# Patient Record
Sex: FEMALE | Race: WHITE | NOT HISPANIC OR LATINO | Employment: FULL TIME | URBAN - METROPOLITAN AREA
[De-identification: names, ages, dates, MRNs, and addresses within clinical notes are randomized per-mention and may not be internally consistent; named-entity substitution may affect disease eponyms.]

---

## 2021-05-07 ENCOUNTER — IMMUNIZATION (OUTPATIENT)
Dept: INTERNAL MEDICINE | Facility: CLINIC | Age: 51
End: 2021-05-07

## 2021-06-04 ENCOUNTER — IMMUNIZATION (OUTPATIENT)
Dept: INTERNAL MEDICINE | Facility: CLINIC | Age: 51
End: 2021-06-04

## 2021-06-04 DIAGNOSIS — Z23 NEED FOR VACCINATION: Primary | ICD-10-CM

## 2021-06-04 PROCEDURE — 91301 COVID-19, MRNA, LNP-S, PF, 100 MCG/0.5 ML DOSE VACCINE: ICD-10-PCS | Mod: ,,, | Performed by: FAMILY MEDICINE

## 2021-06-04 PROCEDURE — 0012A COVID-19, MRNA, LNP-S, PF, 100 MCG/0.5 ML DOSE VACCINE: ICD-10-PCS | Mod: CV19,,, | Performed by: FAMILY MEDICINE

## 2021-06-04 PROCEDURE — 91301 COVID-19, MRNA, LNP-S, PF, 100 MCG/0.5 ML DOSE VACCINE: CPT | Mod: ,,, | Performed by: FAMILY MEDICINE

## 2021-06-04 PROCEDURE — 0012A COVID-19, MRNA, LNP-S, PF, 100 MCG/0.5 ML DOSE VACCINE: CPT | Mod: CV19,,, | Performed by: FAMILY MEDICINE

## 2021-12-06 ENCOUNTER — TELEPHONE (OUTPATIENT)
Dept: ADMINISTRATIVE | Facility: OTHER | Age: 51
End: 2021-12-06
Payer: COMMERCIAL

## 2021-12-15 ENCOUNTER — IMMUNIZATION (OUTPATIENT)
Dept: FAMILY MEDICINE | Facility: CLINIC | Age: 51
End: 2021-12-15
Payer: COMMERCIAL

## 2021-12-15 DIAGNOSIS — Z23 NEED FOR VACCINATION: Primary | ICD-10-CM

## 2021-12-15 PROCEDURE — 0064A COVID-19, MRNA, LNP-S, PF, 100 MCG/0.25 ML DOSE VACCINE (MODERNA BOOSTER): CPT | Mod: PBBFAC

## 2021-12-28 ENCOUNTER — OFFICE VISIT (OUTPATIENT)
Dept: OBSTETRICS AND GYNECOLOGY | Facility: CLINIC | Age: 51
End: 2021-12-28
Payer: COMMERCIAL

## 2021-12-28 VITALS — WEIGHT: 138.44 LBS

## 2021-12-28 DIAGNOSIS — D21.9 FIBROIDS: Primary | ICD-10-CM

## 2021-12-28 PROCEDURE — 99999 PR PBB SHADOW E&M-EST. PATIENT-LVL II: ICD-10-PCS | Mod: PBBFAC,,, | Performed by: OBSTETRICS & GYNECOLOGY

## 2021-12-28 PROCEDURE — 99999 PR PBB SHADOW E&M-EST. PATIENT-LVL II: CPT | Mod: PBBFAC,,, | Performed by: OBSTETRICS & GYNECOLOGY

## 2021-12-28 PROCEDURE — 1159F PR MEDICATION LIST DOCUMENTED IN MEDICAL RECORD: ICD-10-PCS | Mod: CPTII,S$GLB,, | Performed by: OBSTETRICS & GYNECOLOGY

## 2021-12-28 PROCEDURE — 1159F MED LIST DOCD IN RCRD: CPT | Mod: CPTII,S$GLB,, | Performed by: OBSTETRICS & GYNECOLOGY

## 2021-12-28 PROCEDURE — 99203 PR OFFICE/OUTPT VISIT, NEW, LEVL III, 30-44 MIN: ICD-10-PCS | Mod: S$GLB,,, | Performed by: OBSTETRICS & GYNECOLOGY

## 2021-12-28 PROCEDURE — 99203 OFFICE O/P NEW LOW 30 MIN: CPT | Mod: S$GLB,,, | Performed by: OBSTETRICS & GYNECOLOGY

## 2022-01-05 ENCOUNTER — HOSPITAL ENCOUNTER (OUTPATIENT)
Dept: RADIOLOGY | Facility: HOSPITAL | Age: 52
Discharge: HOME | End: 2022-01-05
Attending: OBSTETRICS & GYNECOLOGY
Payer: COMMERCIAL

## 2022-01-05 DIAGNOSIS — M25.511 RIGHT SHOULDER PAIN, UNSPECIFIED CHRONICITY: Primary | ICD-10-CM

## 2022-01-05 DIAGNOSIS — D21.9 FIBROIDS: ICD-10-CM

## 2022-01-05 PROCEDURE — 76856 US PELVIS COMPLETE WITH TRANSVAG FOR IUD: ICD-10-PCS | Mod: 26,,, | Performed by: RADIOLOGY

## 2022-01-05 PROCEDURE — 76856 US EXAM PELVIC COMPLETE: CPT | Mod: 26,,, | Performed by: RADIOLOGY

## 2022-01-05 PROCEDURE — 76830 TRANSVAGINAL US NON-OB: CPT | Mod: 26,,, | Performed by: RADIOLOGY

## 2022-01-05 PROCEDURE — 76830 US PELVIS COMPLETE WITH TRANSVAG FOR IUD: ICD-10-PCS | Mod: 26,,, | Performed by: RADIOLOGY

## 2022-01-05 PROCEDURE — 76830 TRANSVAGINAL US NON-OB: CPT | Mod: TC

## 2022-01-06 ENCOUNTER — HOSPITAL ENCOUNTER (OUTPATIENT)
Dept: RADIOLOGY | Facility: CLINIC | Age: 52
Discharge: HOME | End: 2022-01-06
Attending: ORTHOPAEDIC SURGERY
Payer: COMMERCIAL

## 2022-01-06 DIAGNOSIS — M25.511 RIGHT SHOULDER PAIN, UNSPECIFIED CHRONICITY: ICD-10-CM

## 2022-01-06 PROCEDURE — 73030 X-RAY EXAM OF SHOULDER: CPT | Mod: 26,RT,, | Performed by: RADIOLOGY

## 2022-01-06 PROCEDURE — 73030 XR SHOULDER COMPLETE 2 OR MORE VIEWS RIGHT: ICD-10-PCS | Mod: TC,RT,, | Performed by: ORTHOPAEDIC SURGERY

## 2022-01-06 PROCEDURE — 73030 XR SHOULDER COMPLETE 2 OR MORE VIEWS RIGHT: ICD-10-PCS | Mod: 26,RT,, | Performed by: RADIOLOGY

## 2022-01-06 PROCEDURE — 73030 X-RAY EXAM OF SHOULDER: CPT | Mod: TC,RT,, | Performed by: ORTHOPAEDIC SURGERY

## 2022-01-13 ENCOUNTER — OFFICE VISIT (OUTPATIENT)
Dept: ORTHOPEDICS | Facility: CLINIC | Age: 52
End: 2022-01-13
Payer: COMMERCIAL

## 2022-01-13 VITALS — RESPIRATION RATE: 18 BRPM | WEIGHT: 138 LBS

## 2022-01-13 DIAGNOSIS — M25.511 RIGHT SHOULDER PAIN, UNSPECIFIED CHRONICITY: Primary | ICD-10-CM

## 2022-01-13 DIAGNOSIS — M24.011 LOOSE BODY IN RIGHT SHOULDER: ICD-10-CM

## 2022-01-13 PROCEDURE — 99243 OFF/OP CNSLTJ NEW/EST LOW 30: CPT | Mod: S$GLB,,, | Performed by: ORTHOPAEDIC SURGERY

## 2022-01-13 PROCEDURE — 99999 PR PBB SHADOW E&M-EST. PATIENT-LVL III: ICD-10-PCS | Mod: PBBFAC,,, | Performed by: ORTHOPAEDIC SURGERY

## 2022-01-13 PROCEDURE — 1159F PR MEDICATION LIST DOCUMENTED IN MEDICAL RECORD: ICD-10-PCS | Mod: CPTII,S$GLB,, | Performed by: ORTHOPAEDIC SURGERY

## 2022-01-13 PROCEDURE — 1160F PR REVIEW ALL MEDS BY PRESCRIBER/CLIN PHARMACIST DOCUMENTED: ICD-10-PCS | Mod: CPTII,S$GLB,, | Performed by: ORTHOPAEDIC SURGERY

## 2022-01-13 PROCEDURE — 1160F RVW MEDS BY RX/DR IN RCRD: CPT | Mod: CPTII,S$GLB,, | Performed by: ORTHOPAEDIC SURGERY

## 2022-01-13 PROCEDURE — 99243 PR OFFICE CONSULTATION,LEVEL III: ICD-10-PCS | Mod: S$GLB,,, | Performed by: ORTHOPAEDIC SURGERY

## 2022-01-13 PROCEDURE — 99999 PR PBB SHADOW E&M-EST. PATIENT-LVL III: CPT | Mod: PBBFAC,,, | Performed by: ORTHOPAEDIC SURGERY

## 2022-01-13 PROCEDURE — 1159F MED LIST DOCD IN RCRD: CPT | Mod: CPTII,S$GLB,, | Performed by: ORTHOPAEDIC SURGERY

## 2022-01-13 NOTE — PROGRESS NOTES
History reviewed. No pertinent past medical history.    History reviewed. No pertinent surgical history.    No current outpatient medications on file.     No current facility-administered medications for this visit.       Review of patient's allergies indicates:  No Known Allergies    History reviewed. No pertinent family history.    Social History     Socioeconomic History    Marital status:    Tobacco Use    Smoking status: Former Smoker    Smokeless tobacco: Never Used   Substance and Sexual Activity    Alcohol use: Never       Chief Complaint:   Chief Complaint   Patient presents with    Right Shoulder - Pain       History of present illness:  This is a 51-year-old right-hand-dominant female seen in consultation for Zeynep Quinn for right shoulder pain.  Patient had a fall 2 years ago onto the right shoulder.  She has had pain since then but it has gotten worse over the last 3 months.  Pain reaching arm up or overhead.  Does not have full range of motion.  Difficulty lift her arm up to eat.  Constant pain.  Pain is a 5/10.  No prior treatment.      Review of Systems:    Constitution: Negative for chills, fever, and sweats.  Negative for unexplained weight loss.    HENT:  Negative for headaches and blurry vision.    Cardiovascular:Negative for chest pain or irregular heart beat. Negative for hypertension.    Respiratory:  Negative for cough and shortness of breath.    Gastrointestinal: Negative for abdominal pain, heartburn, melena, nausea, and vomitting.    Genitourinary:  Negative bladder incontinence and dysuria.    Musculoskeletal:  See HPI    Neurological: Negative for numbness.    Psychiatric/Behavioral: Negative for depression.  The patient is not nervous/anxious.      Endocrine: Negative for polyuria    Hematologic/Lymphatic: Negative for bleeding problem.  Does not bruise/bleed easily.    Skin: Negative for poor would healing and rash      Physical Examination:    Vital Signs:    Vitals:     01/13/22 1543   Resp: 18       There is no height or weight on file to calculate BMI.    This a well-developed, well nourished patient in no acute distress.  They are alert and oriented and cooperative to examination.  Pt. walks without an antalgic gait.      Examination of the right shoulder shows no rashes or erythema. There are no masses, ecchymosis, or atrophy. The patient has full range of motion in forward flexion, external rotation, and internal rotation to the mid T-spine. The patient has positive Odalis test.  Positive Neer - Gifford's test. - Speeds test. Nontender to palpation over a.c. joint. Normal stability anteriorly, posteriorly, and negative sulcus sign.  Patient does have some crepitus or popping with internal and external rotation.  Passive range of motion: Forward flexion of 180°, external rotation at 90° of 90°, internal rotation of 50°, and external rotation at 0° of 50°. 2+ radial pulse. Intact axillary, radial, median and ulnar sensation.  4+ out of 5 resisted forward flexion, external rotation, and negative lift off test.    Examination of the left shoulder shows no rashes or erythema. There are no masses, ecchymosis, or atrophy. The patient has full range of motion in forward flexion, external rotation, and internal rotation to the mid T-spine. The patient has - Odalis test. - Neer - Gifford's test. - Speeds test. Nontender to palpation over a.c. joint. Normal stability anteriorly, posteriorly, and negative sulcus sign. Passive range of motion: Forward flexion of 180°, external rotation at 90° of 90°, internal rotation of 50°, and external rotation at 0° of 50°. 2+ radial pulse. Intact axillary, radial, median and ulnar sensation. 5 out of 5 resisted forward flexion, external rotation, and negative lift off test.        X-rays:  X-rays of the right shoulder available for review which show a possible loose body.     Assessment::  Right shoulder pain with possible loose body or traumatic  tear    Plan:  I reviewed the findings with her today.  Patient had a trauma to the right shoulder with continued pain since then.  Patient has some catching in her shoulder and an x-ray with findings concerning for possible loose body.  I recommended an MRI of the right shoulder to further evaluate.    This note was created using [employer] voice recognition software that occasionally misinterpreted phrases or words.    Consult note is delivered via Epic messaging service.

## 2022-01-26 ENCOUNTER — HOSPITAL ENCOUNTER (OUTPATIENT)
Dept: RADIOLOGY | Facility: HOSPITAL | Age: 52
Discharge: HOME | End: 2022-01-26
Attending: ORTHOPAEDIC SURGERY
Payer: COMMERCIAL

## 2022-01-26 DIAGNOSIS — M25.511 RIGHT SHOULDER PAIN, UNSPECIFIED CHRONICITY: ICD-10-CM

## 2022-01-26 PROCEDURE — 73221 MRI JOINT UPR EXTREM W/O DYE: CPT | Mod: TC,PO,RT

## 2022-01-31 ENCOUNTER — OFFICE VISIT (OUTPATIENT)
Dept: ORTHOPEDICS | Facility: CLINIC | Age: 52
End: 2022-01-31
Payer: COMMERCIAL

## 2022-01-31 VITALS — WEIGHT: 138 LBS | RESPIRATION RATE: 18 BRPM

## 2022-01-31 DIAGNOSIS — S46.011D TRAUMATIC COMPLETE TEAR OF RIGHT ROTATOR CUFF, SUBSEQUENT ENCOUNTER: ICD-10-CM

## 2022-01-31 DIAGNOSIS — M25.511 RIGHT SHOULDER PAIN, UNSPECIFIED CHRONICITY: Primary | ICD-10-CM

## 2022-01-31 PROCEDURE — 99214 OFFICE O/P EST MOD 30 MIN: CPT | Mod: 57,S$GLB,, | Performed by: ORTHOPAEDIC SURGERY

## 2022-01-31 PROCEDURE — 1159F MED LIST DOCD IN RCRD: CPT | Mod: CPTII,S$GLB,, | Performed by: ORTHOPAEDIC SURGERY

## 2022-01-31 PROCEDURE — 99999 PR PBB SHADOW E&M-EST. PATIENT-LVL III: CPT | Mod: PBBFAC,,, | Performed by: ORTHOPAEDIC SURGERY

## 2022-01-31 PROCEDURE — 1160F PR REVIEW ALL MEDS BY PRESCRIBER/CLIN PHARMACIST DOCUMENTED: ICD-10-PCS | Mod: CPTII,S$GLB,, | Performed by: ORTHOPAEDIC SURGERY

## 2022-01-31 PROCEDURE — 99214 PR OFFICE/OUTPT VISIT, EST, LEVL IV, 30-39 MIN: ICD-10-PCS | Mod: 57,S$GLB,, | Performed by: ORTHOPAEDIC SURGERY

## 2022-01-31 PROCEDURE — 1159F PR MEDICATION LIST DOCUMENTED IN MEDICAL RECORD: ICD-10-PCS | Mod: CPTII,S$GLB,, | Performed by: ORTHOPAEDIC SURGERY

## 2022-01-31 PROCEDURE — 99999 PR PBB SHADOW E&M-EST. PATIENT-LVL III: ICD-10-PCS | Mod: PBBFAC,,, | Performed by: ORTHOPAEDIC SURGERY

## 2022-01-31 PROCEDURE — 1160F RVW MEDS BY RX/DR IN RCRD: CPT | Mod: CPTII,S$GLB,, | Performed by: ORTHOPAEDIC SURGERY

## 2022-01-31 NOTE — PROGRESS NOTES
History reviewed. No pertinent past medical history.    History reviewed. No pertinent surgical history.    No current outpatient medications on file.     No current facility-administered medications for this visit.       Review of patient's allergies indicates:  No Known Allergies    History reviewed. No pertinent family history.    Social History     Socioeconomic History    Marital status:    Tobacco Use    Smoking status: Former Smoker    Smokeless tobacco: Never Used   Substance and Sexual Activity    Alcohol use: Never       Chief Complaint:   Chief Complaint   Patient presents with    Right Shoulder - Pain       History of present illness:  This is a 51-year-old right-hand-dominant female seen in consultation for Zeynep Quinn for right shoulder pain.  Patient had a fall 2 years ago onto the right shoulder.  She has had pain since then but it has gotten worse over the last 3 months.  Pain reaching arm up or overhead.  Does not have full range of motion.  Difficulty lift her arm up to eat.  Constant pain.  Pain is a 5/10.  No prior treatment.  MRI did show a full-thickness rotator cuff tear.      Review of Systems:    Constitution: Negative for chills, fever, and sweats.  Negative for unexplained weight loss.    HENT:  Negative for headaches and blurry vision.    Cardiovascular:Negative for chest pain or irregular heart beat. Negative for hypertension.    Respiratory:  Negative for cough and shortness of breath.    Gastrointestinal: Negative for abdominal pain, heartburn, melena, nausea, and vomitting.    Genitourinary:  Negative bladder incontinence and dysuria.    Musculoskeletal:  See HPI    Neurological: Negative for numbness.    Psychiatric/Behavioral: Negative for depression.  The patient is not nervous/anxious.      Endocrine: Negative for polyuria    Hematologic/Lymphatic: Negative for bleeding problem.  Does not bruise/bleed easily.    Skin: Negative for poor would healing and  rash      Physical Examination:    Vital Signs:    Vitals:    01/31/22 1357   Resp: 18       There is no height or weight on file to calculate BMI.    This a well-developed, well nourished patient in no acute distress.  They are alert and oriented and cooperative to examination.  Pt. walks without an antalgic gait.      Examination of the right shoulder shows no rashes or erythema. There are no masses, ecchymosis, or atrophy. The patient has full range of motion in forward flexion, external rotation, and internal rotation to the mid T-spine. The patient has positive Odalis test.  Positive Neer - Kane's test. - Speeds test. Nontender to palpation over a.c. joint. Normal stability anteriorly, posteriorly, and negative sulcus sign.  Patient does have some crepitus or popping with internal and external rotation.  Passive range of motion: Forward flexion of 180°, external rotation at 90° of 90°, internal rotation of 50°, and external rotation at 0° of 50°. 2+ radial pulse. Intact axillary, radial, median and ulnar sensation.  4+ out of 5 resisted forward flexion, external rotation, and negative lift off test.    Heart is regular rate without obvious murmurs   Normal respiratory effort without audible wheezing  Abdomen is soft and nontender     X-rays:  X-rays of the right shoulder available for review which show a possible loose body.    MRI of the right shoulder is reviewed and interpreted today:1.  Mild tendinosis of the supraspinatus tendon with focal full-thickness tear of the distal tendon at the footprint with an intrasubstance component.  2.  There is associated fluid in the subacromion/subdeltoid bursa.  3.  Mild tendinosis of the infraspinatus tendon.  4.  Moderate degenerative changes of the AC joint with small undersurface osteophytes.     Assessment::  Right shoulder pain with   tear    Plan:  I reviewed the findings with her today.  Patient had a trauma to the right shoulder with continued pain since then.   Patient has some catching in her shoulder and MRI consistent with a rotator cuff tear.  We talked about rotator cuff repair versus non operative treatment.  She would like to go ahead and get this scheduled.  Risks, benefits, and alternatives to the procedure were explained to the patient including but not limited to damage to nerves, arteries, blood vessels, bones, tendons, ligaments, stiffness, instability, infection, DVT, PE, as well as general anesthetic complications including seizure, stroke, heart attack and even death. The patient understood these risks and wished to proceed and signed the informed consent.       This note was created using [employer] voice recognition software that occasionally misinterpreted phrases or words.    Consult note is delivered via Epic messaging service.

## 2022-02-11 ENCOUNTER — TELEPHONE (OUTPATIENT)
Dept: ORTHOPEDICS | Facility: CLINIC | Age: 52
End: 2022-02-11
Payer: COMMERCIAL

## 2022-02-11 NOTE — TELEPHONE ENCOUNTER
----- Message from Byron Mijares sent at 2/11/2022  4:02 PM CST -----  Regarding: discuss Sx date, call pt   Contact: pt   discuss Sx date, call pt

## 2022-02-14 ENCOUNTER — TELEPHONE (OUTPATIENT)
Dept: ORTHOPEDICS | Facility: CLINIC | Age: 52
End: 2022-02-14
Payer: COMMERCIAL

## 2022-02-14 DIAGNOSIS — Z01.818 PREOP TESTING: Primary | ICD-10-CM

## 2022-02-14 NOTE — TELEPHONE ENCOUNTER
----- Message from Lexi Navarro MA sent at 2/14/2022  3:23 PM CST -----  Contact: pt  Have date to schedule surgery   Call back

## 2022-02-14 NOTE — TELEPHONE ENCOUNTER
Called pt booked her sx and her pre and post op apt. Pt verbalized understanding of date time and location of all apt.

## 2022-02-15 DIAGNOSIS — Z01.818 PREOP TESTING: ICD-10-CM

## 2022-02-15 DIAGNOSIS — S46.011D TRAUMATIC COMPLETE TEAR OF RIGHT ROTATOR CUFF, SUBSEQUENT ENCOUNTER: Primary | ICD-10-CM

## 2022-02-15 DIAGNOSIS — M75.101 RIGHT ROTATOR CUFF TEAR: ICD-10-CM

## 2022-02-15 RX ORDER — CEFAZOLIN SODIUM 1 G/3ML
2 INJECTION, POWDER, FOR SOLUTION INTRAMUSCULAR; INTRAVENOUS
Status: CANCELLED | OUTPATIENT
Start: 2022-02-15

## 2022-02-16 ENCOUNTER — TELEPHONE (OUTPATIENT)
Dept: ORTHOPEDICS | Facility: CLINIC | Age: 52
End: 2022-02-16
Payer: COMMERCIAL

## 2022-02-16 NOTE — TELEPHONE ENCOUNTER
Called and informed pt I was in [place] today but will get her pw in the morning on Thursday. Pt verbalized understanding

## 2022-02-16 NOTE — TELEPHONE ENCOUNTER
----- Message from Lexi Navarro MA sent at 2/16/2022  3:10 PM CST -----  Contact: pt  Missed call   Medical cert paperwork  will be delivered today before 4 pm at [address] [place] [address]  Call back [phone]

## 2022-03-03 ENCOUNTER — TELEPHONE (OUTPATIENT)
Dept: ORTHOPEDICS | Facility: CLINIC | Age: 52
End: 2022-03-03
Payer: COMMERCIAL

## 2022-03-03 NOTE — TELEPHONE ENCOUNTER
Patient requested a letter to be excused from work for 3/3 through 3/10 prior to her surgery scheduled on 3/11/22.  This was written and she reports she will  a copy here at our [place] office.      Asa

## 2022-03-03 NOTE — TELEPHONE ENCOUNTER
----- Message from Byron Mijares sent at 3/3/2022 10:55 AM CST -----  Regarding: pre op questions, call pt [phone]  Contact: pt   pre op questions, call pt [phone]

## 2022-03-03 NOTE — TELEPHONE ENCOUNTER
Called and left VM for patient that letter to be out of work is ready for her to  here in [place].     Asa

## 2022-03-03 NOTE — TELEPHONE ENCOUNTER
----- Message from Byron Mijares sent at 3/3/2022  2:16 PM CST -----  Regarding: checking with Asa to see if paperwork is ready, call pt   Contact: pt   checking with Asa to see if paperwork is ready, call pt

## 2022-03-08 ENCOUNTER — LAB VISIT (OUTPATIENT)
Dept: PRIMARY CARE CLINIC | Facility: CLINIC | Age: 52
End: 2022-03-08
Payer: COMMERCIAL

## 2022-03-08 ENCOUNTER — HOSPITAL ENCOUNTER (OUTPATIENT)
Dept: CARDIOLOGY | Facility: HOSPITAL | Age: 52
Discharge: HOME | End: 2022-03-08
Attending: ORTHOPAEDIC SURGERY
Payer: COMMERCIAL

## 2022-03-08 ENCOUNTER — HOSPITAL ENCOUNTER (OUTPATIENT)
Dept: RADIOLOGY | Facility: HOSPITAL | Age: 52
Discharge: HOME | End: 2022-03-08
Attending: ORTHOPAEDIC SURGERY
Payer: COMMERCIAL

## 2022-03-08 DIAGNOSIS — Z01.818 PREOP TESTING: ICD-10-CM

## 2022-03-08 DIAGNOSIS — M25.511 RIGHT SHOULDER PAIN, UNSPECIFIED CHRONICITY: ICD-10-CM

## 2022-03-08 PROCEDURE — 93005 ELECTROCARDIOGRAM TRACING: CPT

## 2022-03-08 PROCEDURE — U0003 INFECTIOUS AGENT DETECTION BY NUCLEIC ACID (DNA OR RNA); SEVERE ACUTE RESPIRATORY SYNDROME CORONAVIRUS 2 (SARS-COV-2) (CORONAVIRUS DISEASE [COVID-19]), AMPLIFIED PROBE TECHNIQUE, MAKING USE OF HIGH THROUGHPUT TECHNOLOGIES AS DESCRIBED BY CMS-2020-01-R: HCPCS | Performed by: ORTHOPAEDIC SURGERY

## 2022-03-08 PROCEDURE — U0005 INFEC AGEN DETEC AMPLI PROBE: HCPCS | Performed by: ORTHOPAEDIC SURGERY

## 2022-03-08 PROCEDURE — 71046 X-RAY EXAM CHEST 2 VIEWS: CPT | Mod: TC,FY

## 2022-03-08 PROCEDURE — 71046 XR CHEST PA AND LATERAL PRE-OP: ICD-10-PCS | Mod: 26,,, | Performed by: RADIOLOGY

## 2022-03-08 PROCEDURE — 71046 X-RAY EXAM CHEST 2 VIEWS: CPT | Mod: 26,,, | Performed by: RADIOLOGY

## 2022-03-09 LAB
SARS-COV-2 RNA RESP QL NAA+PROBE: NOT DETECTED
SARS-COV-2- CYCLE NUMBER: NORMAL

## 2022-03-09 RX ORDER — DEXTROAMPHETAMINE SACCHARATE, AMPHETAMINE ASPARTATE, DEXTROAMPHETAMINE SULFATE AND AMPHETAMINE SULFATE 3.125; 3.125; 3.125; 3.125 MG/1; MG/1; MG/1; MG/1
30 TABLET ORAL DAILY
COMMUNITY
End: 2023-07-18

## 2022-03-09 RX ORDER — LEVOTHYROXINE SODIUM 25 UG/1
25 TABLET ORAL
COMMUNITY

## 2022-03-09 RX ORDER — DOCUSATE SODIUM 250 MG
250 CAPSULE ORAL DAILY
COMMUNITY

## 2022-03-09 RX ORDER — MULTIVITAMIN
1 TABLET ORAL DAILY
COMMUNITY

## 2022-03-10 ENCOUNTER — ANESTHESIA EVENT (OUTPATIENT)
Dept: SURGERY | Facility: HOSPITAL | Age: 52
End: 2022-03-10
Payer: COMMERCIAL

## 2022-03-11 ENCOUNTER — HOSPITAL ENCOUNTER (OUTPATIENT)
Facility: HOSPITAL | Age: 52
Discharge: HOME | End: 2022-03-11
Attending: ORTHOPAEDIC SURGERY | Admitting: ORTHOPAEDIC SURGERY
Payer: COMMERCIAL

## 2022-03-11 ENCOUNTER — ANESTHESIA (OUTPATIENT)
Dept: SURGERY | Facility: HOSPITAL | Age: 52
End: 2022-03-11
Payer: COMMERCIAL

## 2022-03-11 VITALS
HEART RATE: 82 BPM | RESPIRATION RATE: 17 BRPM | DIASTOLIC BLOOD PRESSURE: 81 MMHG | HEIGHT: 66 IN | WEIGHT: 140 LBS | BODY MASS INDEX: 22.5 KG/M2 | OXYGEN SATURATION: 99 % | TEMPERATURE: 98 F | SYSTOLIC BLOOD PRESSURE: 141 MMHG

## 2022-03-11 DIAGNOSIS — M75.101 RIGHT ROTATOR CUFF TEAR: ICD-10-CM

## 2022-03-11 DIAGNOSIS — Z01.818 PREOP TESTING: ICD-10-CM

## 2022-03-11 DIAGNOSIS — S46.011D TRAUMATIC COMPLETE TEAR OF RIGHT ROTATOR CUFF, SUBSEQUENT ENCOUNTER: ICD-10-CM

## 2022-03-11 LAB
B-HCG UR QL: NEGATIVE
CTP QC/QA: YES

## 2022-03-11 PROCEDURE — 25000003 PHARM REV CODE 250: Mod: PO | Performed by: ORTHOPAEDIC SURGERY

## 2022-03-11 PROCEDURE — 29826 PR SHLDR ARTHROSCOP,PART ACROMIOPLAS: ICD-10-PCS | Mod: RT,,, | Performed by: ORTHOPAEDIC SURGERY

## 2022-03-11 PROCEDURE — 64415 NJX AA&/STRD BRCH PLXS IMG: CPT | Mod: 59,RT,, | Performed by: ANESTHESIOLOGY

## 2022-03-11 PROCEDURE — D9220A PRA ANESTHESIA: ICD-10-PCS | Mod: ,,, | Performed by: ANESTHESIOLOGY

## 2022-03-11 PROCEDURE — 25000003 PHARM REV CODE 250: Mod: PO | Performed by: NURSE ANESTHETIST, CERTIFIED REGISTERED

## 2022-03-11 PROCEDURE — 29826 SHO ARTHRS SRG DECOMPRESSION: CPT | Mod: RT,,, | Performed by: ORTHOPAEDIC SURGERY

## 2022-03-11 PROCEDURE — 76942 PR U/S GUIDANCE FOR NEEDLE GUIDANCE: ICD-10-PCS | Mod: 26,,, | Performed by: ANESTHESIOLOGY

## 2022-03-11 PROCEDURE — 37000009 HC ANESTHESIA EA ADD 15 MINS: Mod: PO | Performed by: ORTHOPAEDIC SURGERY

## 2022-03-11 PROCEDURE — 81025 URINE PREGNANCY TEST: CPT | Mod: PO | Performed by: ORTHOPAEDIC SURGERY

## 2022-03-11 PROCEDURE — 27201423 OPTIME MED/SURG SUP & DEVICES STERILE SUPPLY: Mod: PO | Performed by: ORTHOPAEDIC SURGERY

## 2022-03-11 PROCEDURE — 63600175 PHARM REV CODE 636 W HCPCS: Mod: PO | Performed by: ANESTHESIOLOGY

## 2022-03-11 PROCEDURE — 25000003 PHARM REV CODE 250: Mod: PO | Performed by: ANESTHESIOLOGY

## 2022-03-11 PROCEDURE — 27200750 HC INSULATED NEEDLE/ STIMUPLEX: Mod: PO | Performed by: ANESTHESIOLOGY

## 2022-03-11 PROCEDURE — 64415 NJX AA&/STRD BRCH PLXS IMG: CPT | Mod: PO | Performed by: ANESTHESIOLOGY

## 2022-03-11 PROCEDURE — D9220A PRA ANESTHESIA: Mod: ,,, | Performed by: ANESTHESIOLOGY

## 2022-03-11 PROCEDURE — 36000710: Mod: PO | Performed by: ORTHOPAEDIC SURGERY

## 2022-03-11 PROCEDURE — 76942 ECHO GUIDE FOR BIOPSY: CPT | Mod: 26,,, | Performed by: ANESTHESIOLOGY

## 2022-03-11 PROCEDURE — 63600175 PHARM REV CODE 636 W HCPCS: Mod: PO | Performed by: NURSE ANESTHETIST, CERTIFIED REGISTERED

## 2022-03-11 PROCEDURE — 36000711: Mod: PO | Performed by: ORTHOPAEDIC SURGERY

## 2022-03-11 PROCEDURE — 64415 PR NERVE BLOCK INJ, ANES/STEROID, BRACHIAL PLEXUS, INCL IMAG GUIDANCE: ICD-10-PCS | Mod: 59,RT,, | Performed by: ANESTHESIOLOGY

## 2022-03-11 PROCEDURE — 63600175 PHARM REV CODE 636 W HCPCS: Mod: PO | Performed by: ORTHOPAEDIC SURGERY

## 2022-03-11 PROCEDURE — C9290 INJ, BUPIVACAINE LIPOSOME: HCPCS | Mod: PO | Performed by: ANESTHESIOLOGY

## 2022-03-11 PROCEDURE — 37000008 HC ANESTHESIA 1ST 15 MINUTES: Mod: PO | Performed by: ORTHOPAEDIC SURGERY

## 2022-03-11 PROCEDURE — 29823 PR SHLDR ARTHROSCOP,EXTEN DEBRIDE: ICD-10-PCS | Mod: RT,,, | Performed by: ORTHOPAEDIC SURGERY

## 2022-03-11 PROCEDURE — 71000033 HC RECOVERY, INTIAL HOUR: Mod: PO | Performed by: ORTHOPAEDIC SURGERY

## 2022-03-11 PROCEDURE — 29823 SHO ARTHRS SRG XTNSV DBRDMT: CPT | Mod: RT,,, | Performed by: ORTHOPAEDIC SURGERY

## 2022-03-11 RX ORDER — HYDROCODONE BITARTRATE AND ACETAMINOPHEN 5; 325 MG/1; MG/1
1 TABLET ORAL EVERY 4 HOURS PRN
Status: DISCONTINUED | OUTPATIENT
Start: 2022-03-11 | End: 2022-03-11 | Stop reason: HOSPADM

## 2022-03-11 RX ORDER — LIDOCAINE HCL/PF 100 MG/5ML
SYRINGE (ML) INTRAVENOUS
Status: DISCONTINUED | OUTPATIENT
Start: 2022-03-11 | End: 2022-03-11

## 2022-03-11 RX ORDER — KETOROLAC TROMETHAMINE 30 MG/ML
INJECTION, SOLUTION INTRAMUSCULAR; INTRAVENOUS
Status: DISCONTINUED | OUTPATIENT
Start: 2022-03-11 | End: 2022-03-11

## 2022-03-11 RX ORDER — OXYCODONE HYDROCHLORIDE 5 MG/1
5 TABLET ORAL
Status: DISCONTINUED | OUTPATIENT
Start: 2022-03-11 | End: 2022-03-11 | Stop reason: HOSPADM

## 2022-03-11 RX ORDER — DIPHENHYDRAMINE HYDROCHLORIDE 50 MG/ML
INJECTION INTRAMUSCULAR; INTRAVENOUS
Status: DISCONTINUED | OUTPATIENT
Start: 2022-03-11 | End: 2022-03-11

## 2022-03-11 RX ORDER — IBUPROFEN 800 MG/1
800 TABLET ORAL EVERY 6 HOURS PRN
Qty: 60 TABLET | Refills: 0 | Status: SHIPPED | OUTPATIENT
Start: 2022-03-11 | End: 2023-08-02

## 2022-03-11 RX ORDER — SODIUM CHLORIDE, SODIUM LACTATE, POTASSIUM CHLORIDE, CALCIUM CHLORIDE 600; 310; 30; 20 MG/100ML; MG/100ML; MG/100ML; MG/100ML
INJECTION, SOLUTION INTRAVENOUS CONTINUOUS
Status: DISCONTINUED | OUTPATIENT
Start: 2022-03-11 | End: 2022-03-11 | Stop reason: HOSPADM

## 2022-03-11 RX ORDER — FENTANYL CITRATE 50 UG/ML
INJECTION, SOLUTION INTRAMUSCULAR; INTRAVENOUS
Status: DISCONTINUED | OUTPATIENT
Start: 2022-03-11 | End: 2022-03-11

## 2022-03-11 RX ORDER — ONDANSETRON 4 MG/1
4 TABLET, ORALLY DISINTEGRATING ORAL EVERY 8 HOURS PRN
Qty: 30 TABLET | Refills: 0 | Status: SHIPPED | OUTPATIENT
Start: 2022-03-11 | End: 2022-05-02

## 2022-03-11 RX ORDER — EPINEPHRINE 1 MG/ML
INJECTION, SOLUTION INTRACARDIAC; INTRAMUSCULAR; INTRAVENOUS; SUBCUTANEOUS
Status: DISCONTINUED | OUTPATIENT
Start: 2022-03-11 | End: 2022-03-11 | Stop reason: HOSPADM

## 2022-03-11 RX ORDER — MIDAZOLAM HYDROCHLORIDE 1 MG/ML
2 INJECTION INTRAMUSCULAR; INTRAVENOUS
Status: COMPLETED | OUTPATIENT
Start: 2022-03-11 | End: 2022-03-11

## 2022-03-11 RX ORDER — SODIUM CHLORIDE 0.9 G/100ML
IRRIGANT IRRIGATION
Status: DISCONTINUED | OUTPATIENT
Start: 2022-03-11 | End: 2022-03-11 | Stop reason: HOSPADM

## 2022-03-11 RX ORDER — DEXAMETHASONE SODIUM PHOSPHATE 4 MG/ML
INJECTION, SOLUTION INTRA-ARTICULAR; INTRALESIONAL; INTRAMUSCULAR; INTRAVENOUS; SOFT TISSUE
Status: DISCONTINUED | OUTPATIENT
Start: 2022-03-11 | End: 2022-03-11

## 2022-03-11 RX ORDER — ONDANSETRON 2 MG/ML
INJECTION INTRAMUSCULAR; INTRAVENOUS
Status: DISCONTINUED | OUTPATIENT
Start: 2022-03-11 | End: 2022-03-11

## 2022-03-11 RX ORDER — CYCLOBENZAPRINE HCL 10 MG
10 TABLET ORAL 3 TIMES DAILY PRN
Qty: 30 TABLET | Refills: 0 | Status: SHIPPED | OUTPATIENT
Start: 2022-03-11 | End: 2022-05-02

## 2022-03-11 RX ORDER — ROCURONIUM BROMIDE 10 MG/ML
INJECTION, SOLUTION INTRAVENOUS
Status: DISCONTINUED | OUTPATIENT
Start: 2022-03-11 | End: 2022-03-11

## 2022-03-11 RX ORDER — SODIUM CHLORIDE 0.9 % (FLUSH) 0.9 %
10 SYRINGE (ML) INJECTION
Status: DISCONTINUED | OUTPATIENT
Start: 2022-03-11 | End: 2022-03-11 | Stop reason: HOSPADM

## 2022-03-11 RX ORDER — LIDOCAINE HYDROCHLORIDE 10 MG/ML
1 INJECTION, SOLUTION EPIDURAL; INFILTRATION; INTRACAUDAL; PERINEURAL ONCE
Status: DISCONTINUED | OUTPATIENT
Start: 2022-03-11 | End: 2022-03-11 | Stop reason: HOSPADM

## 2022-03-11 RX ORDER — SUCCINYLCHOLINE CHLORIDE 20 MG/ML
INJECTION INTRAMUSCULAR; INTRAVENOUS
Status: DISCONTINUED | OUTPATIENT
Start: 2022-03-11 | End: 2022-03-11

## 2022-03-11 RX ORDER — PROPOFOL 10 MG/ML
VIAL (ML) INTRAVENOUS
Status: DISCONTINUED | OUTPATIENT
Start: 2022-03-11 | End: 2022-03-11

## 2022-03-11 RX ORDER — ACETAMINOPHEN 10 MG/ML
INJECTION, SOLUTION INTRAVENOUS
Status: DISCONTINUED | OUTPATIENT
Start: 2022-03-11 | End: 2022-03-11

## 2022-03-11 RX ORDER — ACETAMINOPHEN 500 MG
1000 TABLET ORAL EVERY 8 HOURS PRN
Qty: 60 TABLET | Refills: 0 | Status: SHIPPED | OUTPATIENT
Start: 2022-03-11 | End: 2023-08-02

## 2022-03-11 RX ORDER — KETAMINE HCL IN 0.9 % NACL 50 MG/5 ML
SYRINGE (ML) INTRAVENOUS
Status: DISCONTINUED | OUTPATIENT
Start: 2022-03-11 | End: 2022-03-11

## 2022-03-11 RX ORDER — FENTANYL CITRATE 50 UG/ML
25 INJECTION, SOLUTION INTRAMUSCULAR; INTRAVENOUS EVERY 5 MIN PRN
Status: DISCONTINUED | OUTPATIENT
Start: 2022-03-11 | End: 2022-03-11 | Stop reason: HOSPADM

## 2022-03-11 RX ORDER — FENTANYL CITRATE 50 UG/ML
100 INJECTION, SOLUTION INTRAMUSCULAR; INTRAVENOUS
Status: COMPLETED | OUTPATIENT
Start: 2022-03-11 | End: 2022-03-11

## 2022-03-11 RX ORDER — METOCLOPRAMIDE HYDROCHLORIDE 5 MG/ML
10 INJECTION INTRAMUSCULAR; INTRAVENOUS EVERY 10 MIN PRN
Status: DISCONTINUED | OUTPATIENT
Start: 2022-03-11 | End: 2022-03-11 | Stop reason: HOSPADM

## 2022-03-11 RX ORDER — OXYCODONE AND ACETAMINOPHEN 5; 325 MG/1; MG/1
1 TABLET ORAL EVERY 6 HOURS PRN
Qty: 28 TABLET | Refills: 0 | Status: SHIPPED | OUTPATIENT
Start: 2022-03-11 | End: 2022-05-02

## 2022-03-11 RX ORDER — BUPIVACAINE HYDROCHLORIDE 5 MG/ML
INJECTION, SOLUTION EPIDURAL; INTRACAUDAL
Status: DISCONTINUED | OUTPATIENT
Start: 2022-03-11 | End: 2022-03-11

## 2022-03-11 RX ADMIN — FENTANYL CITRATE 50 MCG: 50 INJECTION, SOLUTION INTRAMUSCULAR; INTRAVENOUS at 09:03

## 2022-03-11 RX ADMIN — Medication 20 MG: at 09:03

## 2022-03-11 RX ADMIN — ACETAMINOPHEN 1000 MG: 10 INJECTION, SOLUTION INTRAVENOUS at 09:03

## 2022-03-11 RX ADMIN — SUCCINYLCHOLINE CHLORIDE 120 MG: 20 INJECTION, SOLUTION INTRAMUSCULAR; INTRAVENOUS at 09:03

## 2022-03-11 RX ADMIN — DEXAMETHASONE SODIUM PHOSPHATE 8 MG: 4 INJECTION, SOLUTION INTRAMUSCULAR; INTRAVENOUS at 09:03

## 2022-03-11 RX ADMIN — PROPOFOL 200 MG: 10 INJECTION, EMULSION INTRAVENOUS at 09:03

## 2022-03-11 RX ADMIN — SODIUM CHLORIDE, SODIUM LACTATE, POTASSIUM CHLORIDE, AND CALCIUM CHLORIDE: .6; .31; .03; .02 INJECTION, SOLUTION INTRAVENOUS at 09:03

## 2022-03-11 RX ADMIN — DIPHENHYDRAMINE HYDROCHLORIDE 10 MG: 50 INJECTION INTRAMUSCULAR; INTRAVENOUS at 09:03

## 2022-03-11 RX ADMIN — ONDANSETRON 4 MG: 2 INJECTION INTRAMUSCULAR; INTRAVENOUS at 09:03

## 2022-03-11 RX ADMIN — BUPIVACAINE 10 ML: 13.3 INJECTION, SUSPENSION, LIPOSOMAL INFILTRATION at 08:03

## 2022-03-11 RX ADMIN — SODIUM CHLORIDE, SODIUM LACTATE, POTASSIUM CHLORIDE, AND CALCIUM CHLORIDE: .6; .31; .03; .02 INJECTION, SOLUTION INTRAVENOUS at 08:03

## 2022-03-11 RX ADMIN — BUPIVACAINE HYDROCHLORIDE 5 ML: 5 INJECTION, SOLUTION EPIDURAL; INTRACAUDAL; PERINEURAL at 08:03

## 2022-03-11 RX ADMIN — ROCURONIUM BROMIDE 10 MG: 10 INJECTION, SOLUTION INTRAVENOUS at 09:03

## 2022-03-11 RX ADMIN — FENTANYL CITRATE 100 MCG: 50 INJECTION INTRAMUSCULAR; INTRAVENOUS at 08:03

## 2022-03-11 RX ADMIN — KETOROLAC TROMETHAMINE 30 MG: 30 INJECTION, SOLUTION INTRAMUSCULAR at 09:03

## 2022-03-11 RX ADMIN — DEXTROSE 2 G: 50 INJECTION, SOLUTION INTRAVENOUS at 09:03

## 2022-03-11 RX ADMIN — MIDAZOLAM 2 MG: 1 INJECTION INTRAMUSCULAR; INTRAVENOUS at 08:03

## 2022-03-11 RX ADMIN — LIDOCAINE HYDROCHLORIDE 75 MG: 20 INJECTION, SOLUTION INTRAVENOUS at 09:03

## 2022-03-11 NOTE — ANESTHESIA PROCEDURE NOTES
Peripheral Block    Patient location during procedure: pre-op   Block not for primary anesthetic.  Reason for block: at surgeon's request and post-op pain management   Post-op Pain Location: right shoulder   Start time: 3/11/2022 8:30 AM  Timeout: 3/11/2022 8:30 AM   End time: 3/11/2022 8:35 AM    Staffing  Authorizing Provider: Victorino Castro MD  Performing Provider: Victorino Castro MD    Preanesthetic Checklist  Completed: patient identified, IV checked, site marked, risks and benefits discussed, surgical consent, monitors and equipment checked, pre-op evaluation and timeout performed  Peripheral Block  Patient position: sitting  Prep: ChloraPrep  Patient monitoring: heart rate, cardiac monitor, continuous pulse ox, continuous capnometry and frequent blood pressure checks  Block type: interscalene  Laterality: right  Injection technique: single shot  Needle  Needle type: Stimuplex   Needle gauge: 22 G  Needle length: 2 in  Needle localization: anatomical landmarks and ultrasound guidance   -ultrasound image captured on disc.  Assessment  Injection assessment: negative aspiration, negative parasthesia and local visualized surrounding nerve  Paresthesia pain: none  Heart rate change: no  Slow fractionated injection: yes  Pain Tolerance: comfortable throughout block and no complaints  Medications:    Medications: bupivacaine (pf) (MARCAINE) injection 0.5% - Perineural   5 mL - 3/11/2022 8:35:00 AM    Additional Notes  VSS.  [facility] RN monitoring vitals throughout procedure.  Patient tolerated procedure well.

## 2022-03-11 NOTE — ANESTHESIA PROCEDURE NOTES
Intubation    Date/Time: 3/11/2022 9:04 AM  Performed by: Shayne Medina CRNA  Authorized by: Victorino Castro MD     Intubation:     Induction:  Intravenous    Intubated:  Postinduction    Mask Ventilation:  Easy mask    Attempts:  1    Attempted By:  CRNA    Method of Intubation:  Video laryngoscopy    Blade:  Finch 3    Laryngeal View Grade: Grade I - full view of cords      Difficult Airway Encountered?: No      Complications:  None    Airway Device:  Oral endotracheal tube    Airway Device Size:  7.0    Style/Cuff Inflation:  Cuffed (inflated to minimal occlusive pressure)    Tube secured:  20    Secured at:  The lips    Placement Verified By:  Capnometry    Complicating Factors:  None    Findings Post-Intubation:  BS equal bilateral and atraumatic/condition of teeth unchanged

## 2022-03-11 NOTE — OR NURSING
Right interscalene single shot block complete per Dr. Castro with Anesthesia. Pt tolerated well. See Anesthesia record for procedural notes. See flowsheet and MAR for vitals and medications. Monitors in place, alarms audible. VSS. etCO2 monitoring in place. Resp even, unlabored. Skin warm, dry. Pt in NAD. Pt awaiting transport to OR. Family at bedside. Bed in lowest, locked position. Side rails up x2. Call light within reach.

## 2022-03-11 NOTE — DISCHARGE INSTRUCTIONS
Shoulder Scope/Rotator Cuff    After surgery:  DOs   Minimal activity and eat light meals for next 24 hours.   Keep operative site clean and dry for 3 days.   Remove bandages in 3 days, then shower. Pat dry and place ban-aids over puncture sites and reapply sling.   Wear sling at all times until block wears off.   Apply ice to shoulder for next 3 days for 30 minutes intervals only while awake.   Move fingers and check circulation by pressing on nails. Color should be white to pink.   Resume all home medication.    DON'T   Do not soak.   No driving for 24 hours or while taking narcotic pain medication.   NO ADDITIONAL TYLENOL WHILE TAKING NARCOTIC THAT CONTAIN TYLENOL.    Call doctor for signs of infection (redness, increase swelling and pus drainage) and fever greater syib910. [phone]

## 2022-03-11 NOTE — PATIENT INSTRUCTIONS
1.Diet: Ice chips, clear liquids, and then diet as tolerated. Drink plenty of liquids.  2.Ice the area at least three times a day (20 minutes per session).  3.Elevate the extremity above the level of the heart to help reduce swelling.  4.Pain medication can be taken every four to six hours as needed. It is helpful to take pain medication prior to physical therapy.  Use Tylenol or anti-inflammatories for pain as much as possible.  Pain medication is for pain not responding to over-the-counter medications only.  5.Any activity that requires precise thinking or accuracy should be avoided for a minimum of 72 hours after surgery and while on narcotic pain medication. This includes operating machinery and/or driving a vehicle.  6.All sutures/staples will be removed approximately 14 days from the time of surgery. Leave steri-strips (skin tapes) in place until sutures are removed.  7. If skin glue is used instead of stitches, do not apply ointments or solutions to the incision. Keep the incision dry. The skin glue will peel off in 3-4 weeks.  8. Change dressing on the 2nd post-op day. Use gauze for the first 3 days, then start using Band-Aids over the incision sites.   9. All casts, splints, braces, slings, crutches, abduction pillows, etc... Are to be worn as instructed. Use sling at all times except for showering and exercises.  10. Keep the incision dry for 10-14 days. A waterproof dressing (purchase at [employer], [employer], etc) can be used to shower. No bath, pool, hot tub until instructed.  11. Start PT in 14 days. Call office to help with scheduling.  12. Call [phone] with any questions or concerns.

## 2022-03-11 NOTE — DISCHARGE SUMMARY
[facility] - Surgery  Discharge Note  Short Stay    Procedure(s):  ACROMIOPLASTY, ARTHROSCOPIC    OUTCOME: Patient tolerated treatment/procedure well without complication and is now ready for discharge.    DISPOSITION: Home or Self Care    FINAL DIAGNOSIS:  <principal problem not specified>    FOLLOWUP: In clinic    DISCHARGE INSTRUCTIONS:    Discharge Procedure Orders   Diet general     Ice to affected area     Lifting restrictions     No driving, operating heavy equipment or signing legal documents while taking pain medication     Remove dressing in 48 hours     Change dressing (specify)   Order Comments: Dressing change: 1 times per day using waterproof bandaids.     Call MD for:  temperature >100.4     Call MD for:  persistent nausea and vomiting     Call MD for:  severe uncontrolled pain     Call MD for:  difficulty breathing, headache or visual disturbances     Call MD for:  redness, tenderness, or signs of infection (pain, swelling, redness, odor or green/yellow discharge around incision site)     Call MD for:  hives     Call MD for:  persistent dizziness or light-headedness     Call MD for:  extreme fatigue        TIME SPENT ON DISCHARGE: 5 minutes

## 2022-03-11 NOTE — H&P
History reviewed. No pertinent past medical history.     History reviewed. No pertinent surgical history.     No current outpatient medications on file.      No current facility-administered medications for this visit.         Review of patient's allergies indicates:  No Known Allergies     History reviewed. No pertinent family history.     Social History              Socioeconomic History    Marital status:    Tobacco Use    Smoking status: Former Smoker    Smokeless tobacco: Never Used   Substance and Sexual Activity    Alcohol use: Never            Chief Complaint:       Chief Complaint   Patient presents with    Right Shoulder - Pain         History of present illness:  This is a 51-year-old right-hand-dominant female seen in consultation for Zeynep Quinn for right shoulder pain.  Patient had a fall 2 years ago onto the right shoulder.  She has had pain since then but it has gotten worse over the last 3 months.  Pain reaching arm up or overhead.  Does not have full range of motion.  Difficulty lift her arm up to eat.  Constant pain.  Pain is a 5/10.  No prior treatment.  MRI did show a full-thickness rotator cuff tear.        Review of Systems:     Constitution: Negative for chills, fever, and sweats.  Negative for unexplained weight loss.     HENT:  Negative for headaches and blurry vision.     Cardiovascular:Negative for chest pain or irregular heart beat. Negative for hypertension.     Respiratory:  Negative for cough and shortness of breath.     Gastrointestinal: Negative for abdominal pain, heartburn, melena, nausea, and vomitting.     Genitourinary:  Negative bladder incontinence and dysuria.     Musculoskeletal:  See HPI     Neurological: Negative for numbness.     Psychiatric/Behavioral: Negative for depression.  The patient is not nervous/anxious.       Endocrine: Negative for polyuria     Hematologic/Lymphatic: Negative for bleeding problem.  Does not bruise/bleed easily.     Skin:  Negative for poor would healing and rash        Physical Examination:     Vital Signs:        Vitals:     01/31/22 1357   Resp: 18         There is no height or weight on file to calculate BMI.     This a well-developed, well nourished patient in no acute distress.  They are alert and oriented and cooperative to examination.  Pt. walks without an antalgic gait.       Examination of the right shoulder shows no rashes or erythema. There are no masses, ecchymosis, or atrophy. The patient has full range of motion in forward flexion, external rotation, and internal rotation to the mid T-spine. The patient has positive Odalis test.  Positive Neer - Rush's test. - Speeds test. Nontender to palpation over a.c. joint. Normal stability anteriorly, posteriorly, and negative sulcus sign.  Patient does have some crepitus or popping with internal and external rotation.  Passive range of motion: Forward flexion of 180°, external rotation at 90° of 90°, internal rotation of 50°, and external rotation at 0° of 50°. 2+ radial pulse. Intact axillary, radial, median and ulnar sensation.  4+ out of 5 resisted forward flexion, external rotation, and negative lift off test.     Heart is regular rate without obvious murmurs   Normal respiratory effort without audible wheezing  Abdomen is soft and nontender      X-rays:  X-rays of the right shoulder available for review which show a possible loose body.     MRI of the right shoulder is reviewed and interpreted today:1.  Mild tendinosis of the supraspinatus tendon with focal full-thickness tear of the distal tendon at the footprint with an intrasubstance component.  2.  There is associated fluid in the subacromion/subdeltoid bursa.  3.  Mild tendinosis of the infraspinatus tendon.  4.  Moderate degenerative changes of the AC joint with small undersurface osteophytes.     Assessment::  Right shoulder pain with   tear     Plan:  I reviewed the findings with her today.  Patient had a trauma to  the right shoulder with continued pain since then.  Patient has some catching in her shoulder and MRI consistent with a rotator cuff tear.  We talked about rotator cuff repair versus non operative treatment.  She would like to go ahead and get this scheduled.  Risks, benefits, and alternatives to the procedure were explained to the patient including but not limited to damage to nerves, arteries, blood vessels, bones, tendons, ligaments, stiffness, instability, infection, DVT, PE, as well as general anesthetic complications including seizure, stroke, heart attack and even death. The patient understood these risks and wished to proceed and signed the informed consent.         This note was created using [employer] voice recognition software that occasionally misinterpreted phrases or words.     Consult note is delivered via Epic messaging service.

## 2022-03-11 NOTE — OP NOTE
[facility]  Orthopedic Surgery  Operative Note    SUMMARY     Date of Procedure: 3/11/2022     Procedure: Procedure(s) (LRB):  Right arthroscopic extensive debridement.  Right arthroscopic subacromial decompression with acromioplasty    Surgeon(s) and Role:     * Jose Angel Isaac MD - Primary    Assistant: Abdi SILVA    Pre-Operative Diagnosis: Traumatic complete tear of right rotator cuff, subsequent encounter [S46.011D]  Preop testing [Z01.818]    Post-Operative Diagnosis: Post-Op Diagnosis Codes:     Incomplete tear of the right rotator cuff  Right significant impingement syndrome.    Anesthesia: General      Description of the Findings of the Procedure: Diagnostic arthroscopy findings on the patient's Right shoulder showed well   maintained labrum, biceps tendon had some very minimal fraying just right at its labral base but the remainder of the tendon looked normal.  Patient did have some partial-thickness tearing at the insertion of the subscapularis onto the bone without retraction or tearing off the lesser tuberosity. The patient had normal  articular appearance of the rotator cuff. Articular surface was normal. In the subacromial space, the patient had bursitis in the subacromial space with a type 2 acromion. Ac joint showed moderate arthritic changes with some inferior spurring. Bursal surface of the rotator cuff was intact.      Complications: No    Estimated Blood Loss (EBL): * No values recorded between 3/11/2022  9:19 AM and 3/11/2022  9:47 AM *           Implants: * No implants in log *    Specimens:   Specimen (24h ago, onward)            None                  Condition: Good    Disposition: PACU - hemodynamically stable.    Attestation: I was present and scrubbed for the entire procedure.      Indications for the procedure:A 51 year old female with a history of Right shoulder pain that failed to resolve with physical therapy, activity modification, injections, and rest.   Patient desired the procedure listed above after MRI was obtained.    PROCEDURE IN DETAIL: Risks, benefits and alternatives of the procedure were   explained to the patient including, but not limited to damage to nerves,   arteries and blood vessels. Also explained risk of re-rupture, nonhealing, infection, DVT,   PE as well as anesthetic complications including seizure, stroke, heart attack   and death. They understood this, signed informed consent. The patient's right  shoulder was marked prior to coming to the Operating Room. Once there a formal   timeout was done in which correct patient, procedure and operative site were all   correctly identified and confirmed by the entire operating team. Ancef 2 g was   given prior to surgical incision. General anesthesia was induced. The   patient was placed in lateral decubitus position on a bean bag with his right upper extremity   hanging in balanced suspension.The arm was suspended with 10 lbs of weight for balanced traction. The extremity was prepped and draped in normal   sterile fashion.A standard posterior portal was then made. A spinal   needle was used to localize an anterior portal within the rotator interval and   this was made as well. We then performed a diagnostic arthroscopy of the   glenohumeral joint through both the anterior and posterior viewing portals,with the findings listed above.  Shaver was used to form a debridement within the joint.  Some of the biceps fraying was debrided with a shaver and then ablator.  Some of the subscapularis fraying was debrided again with the shaver and the ablator.  Subscap was reviewed in detail and no repair was needed.  The undersurface of the supraspinatus and the more posterior rotator cuff  was intact.  The patient did have some cystic change within the greater tuberosity.    After this was done, we then proceeded up in the subacromial space   where a spinal needle was used to localize a lateral portal and then this  was   made as well. A 4.0 shaver was used to perform a subtotal bursectomy. We then   used the ablator to remove the soft tissue off the undersurface of the acromion.We then thoroughly inspected the cuff on the rotator cuff side. We shaved away any additional adhesions in the anterior, lateral on posterior gutters.We were unable to find any evidence for a bursal sided cuff tear.  Patient did have a significant impingement with decrease in the acromial humeral distance.  A 4-0 teresa was used to turn a type 2 acromion into a type 1. It was also used to resect some inferior spurring off the distal clavicle.      All excess fluid was removed from the shoulder. The portals were closed using nylon. Sterile dressing applied.  They were then placed in a regular sling, extubated, awakened and transferred from the Operating Room to   Recovery Room in stable condition.     Postoperative rehab course will be for subacromial decompression with acromioplasty and extensive debridement.

## 2022-03-11 NOTE — TRANSFER OF CARE
"Anesthesia Transfer of Care Note    Patient: Keren Sky    Procedure(s) Performed: Procedure(s):  ACROMIOPLASTY, ARTHROSCOPIC  DEBRIDEMENT, SHOULDER, ARTHROSCOPIC  ARTHROSCOPY, SHOULDER, WITH SUBACROMIAL SPACE DECOMPRESSION    Patient location: PACU    Anesthesia Type: general    Transport from OR: Transported from OR on room air with adequate spontaneous ventilation    Post pain: adequate analgesia    Post assessment: no apparent anesthetic complications and tolerated procedure well    Post vital signs: stable    Level of consciousness: sedated and awake    Nausea/Vomiting: no nausea/vomiting    Complications: none    Transfer of care protocol was followed      Last vitals:   Visit Vitals  /66 (BP Location: Left arm, Patient Position: Lying)   Pulse 74   Temp 36.8 °C (98.2 °F) (Skin)   Resp 11   Ht 5' 6" (1.676 m)   Wt 63.5 kg (140 lb)   LMP  (LMP Unknown)   SpO2 100%   Breastfeeding No   BMI 22.60 kg/m²     "

## 2022-03-21 ENCOUNTER — OFFICE VISIT (OUTPATIENT)
Dept: ORTHOPEDICS | Facility: CLINIC | Age: 52
End: 2022-03-21
Payer: COMMERCIAL

## 2022-03-21 VITALS — HEIGHT: 66 IN | BODY MASS INDEX: 22.5 KG/M2 | WEIGHT: 140 LBS | RESPIRATION RATE: 18 BRPM

## 2022-03-21 DIAGNOSIS — M75.101 ROTATOR CUFF SYNDROME OF RIGHT SHOULDER: Primary | ICD-10-CM

## 2022-03-21 DIAGNOSIS — M75.100 ROTATOR CUFF SYNDROME, UNSPECIFIED LATERALITY: Primary | ICD-10-CM

## 2022-03-21 PROCEDURE — 99024 POSTOP FOLLOW-UP VISIT: CPT | Mod: S$GLB,,, | Performed by: ORTHOPAEDIC SURGERY

## 2022-03-21 PROCEDURE — 1159F PR MEDICATION LIST DOCUMENTED IN MEDICAL RECORD: ICD-10-PCS | Mod: CPTII,S$GLB,, | Performed by: ORTHOPAEDIC SURGERY

## 2022-03-21 PROCEDURE — 3008F BODY MASS INDEX DOCD: CPT | Mod: CPTII,S$GLB,, | Performed by: ORTHOPAEDIC SURGERY

## 2022-03-21 PROCEDURE — 99024 PR POST-OP FOLLOW-UP VISIT: ICD-10-PCS | Mod: S$GLB,,, | Performed by: ORTHOPAEDIC SURGERY

## 2022-03-21 PROCEDURE — 1160F PR REVIEW ALL MEDS BY PRESCRIBER/CLIN PHARMACIST DOCUMENTED: ICD-10-PCS | Mod: CPTII,S$GLB,, | Performed by: ORTHOPAEDIC SURGERY

## 2022-03-21 PROCEDURE — 99999 PR PBB SHADOW E&M-EST. PATIENT-LVL III: ICD-10-PCS | Mod: PBBFAC,,, | Performed by: ORTHOPAEDIC SURGERY

## 2022-03-21 PROCEDURE — 1160F RVW MEDS BY RX/DR IN RCRD: CPT | Mod: CPTII,S$GLB,, | Performed by: ORTHOPAEDIC SURGERY

## 2022-03-21 PROCEDURE — 99999 PR PBB SHADOW E&M-EST. PATIENT-LVL III: CPT | Mod: PBBFAC,,, | Performed by: ORTHOPAEDIC SURGERY

## 2022-03-21 PROCEDURE — 3008F PR BODY MASS INDEX (BMI) DOCUMENTED: ICD-10-PCS | Mod: CPTII,S$GLB,, | Performed by: ORTHOPAEDIC SURGERY

## 2022-03-21 PROCEDURE — 1159F MED LIST DOCD IN RCRD: CPT | Mod: CPTII,S$GLB,, | Performed by: ORTHOPAEDIC SURGERY

## 2022-03-21 NOTE — PROGRESS NOTES
Past Medical History:   Diagnosis Date    ADHD     Thyroid disease        Past Surgical History:   Procedure Laterality Date    APPENDECTOMY      ARTHROSCOPIC ACROMIOPLASTY OF SHOULDER  3/11/2022    Procedure: ACROMIOPLASTY, ARTHROSCOPIC;  Surgeon: Jose Angel Isaac MD;  Location: [facility] OR;  Service: Orthopedics;;    ARTHROSCOPIC DEBRIDEMENT OF SHOULDER  3/11/2022    Procedure: DEBRIDEMENT, SHOULDER, ARTHROSCOPIC;  Surgeon: Jose Angel Isaac MD;  Location: [facility] OR;  Service: Orthopedics;;    ARTHROSCOPY OF SHOULDER WITH DECOMPRESSION OF SUBACROMIAL SPACE  3/11/2022    Procedure: ARTHROSCOPY, SHOULDER, WITH SUBACROMIAL SPACE DECOMPRESSION;  Surgeon: Jose Angel Isaac MD;  Location: [facility] OR;  Service: Orthopedics;;    BREAST SURGERY      CARPAL TUNNEL RELEASE      OOPHORECTOMY         Current Outpatient Medications   Medication Sig    acetaminophen (TYLENOL) 500 MG tablet Take 2 tablets (1,000 mg total) by mouth every 8 (eight) hours as needed for Pain.    cyclobenzaprine (FLEXERIL) 10 MG tablet Take 1 tablet (10 mg total) by mouth 3 (three) times daily as needed for Muscle spasms.    dextroamphetamine-amphetamine (ADDERALL) 12.5 MG tablet Take 30 mg by mouth once daily.    docusate sodium (COLACE) 250 MG capsule Take 250 mg by mouth once daily.    ibuprofen (ADVIL,MOTRIN) 800 MG tablet Take 1 tablet (800 mg total) by mouth every 6 (six) hours as needed for Pain.    levothyroxine (SYNTHROID) 25 MCG tablet Take 25 mcg by mouth before breakfast.    multivitamin (THERAGRAN) per tablet Take 1 tablet by mouth once daily.    ondansetron (ZOFRAN-ODT) 4 MG TbDL Take 1 tablet (4 mg total) by mouth every 8 (eight) hours as needed (nausea).    oxyCODONE-acetaminophen (PERCOCET) 5-325 mg per tablet Take 1 tablet by mouth every 6 (six) hours as needed for Pain (not controlled by tylenol).     No current facility-administered medications for this visit.       Review of patient's allergies  indicates:  No Known Allergies    History reviewed. No pertinent family history.    Social History     Socioeconomic History    Marital status:    Tobacco Use    Smoking status: Former Smoker     Quit date: 2020     Years since quittin.0    Smokeless tobacco: Never Used   Substance and Sexual Activity    Alcohol use: Not Currently       Chief Complaint:   Chief Complaint   Patient presents with    Right Shoulder - Post-op Evaluation       Date of surgery:  2022    History of present illness:  51-year-old female underwent right shoulder arthroscopy.  Patient's rotator cuff ended up looking okay and underwent acromioplasty and extensive debridement.  Has a lot of bursitis as well.  She is wearing the simple sling today.  Pain is a 3/10.  She describes as achy and stiff but no severe pain.      Review of Systems:    Musculoskeletal:  See HPI        Physical Examination:    Vital Signs:    Vitals:    22 1452   Resp: 18       Body mass index is 22.6 kg/m².    This a well-developed, well nourished patient in no acute distress.  They are alert and oriented and cooperative to examination.  Pt. walks without an antalgic gait.      Examination of the right shoulder shows well-healing surgical incisions.  No erythema drainage.  Does have fair amount ecchymosis, down around the elbow.  Range of motion is decent with the the ability to touch the top of her head.  Some weakness actively forward flexing still has to be expected.  Leg every    X-rays:  None     Assessment::  Status post right shoulder arthroscopy with acromioplasty extensive debridement    Plan:  Reviewed the findings with her today.  Took out the stitches.  Recommended physical therapy to help with soreness and inflammation.  Follow-up in 6 weeks    This note was created using [employer] voice recognition software that occasionally misinterpreted phrases or words.

## 2022-03-22 ENCOUNTER — CLINICAL SUPPORT (OUTPATIENT)
Dept: REHABILITATION | Facility: HOSPITAL | Age: 52
End: 2022-03-22
Payer: COMMERCIAL

## 2022-03-22 DIAGNOSIS — M75.101 ROTATOR CUFF SYNDROME OF RIGHT SHOULDER: ICD-10-CM

## 2022-03-22 DIAGNOSIS — M25.60 LIMITED JOINT RANGE OF MOTION (ROM): Primary | ICD-10-CM

## 2022-03-22 DIAGNOSIS — R29.898 SHOULDER WEAKNESS: ICD-10-CM

## 2022-03-22 PROCEDURE — 97161 PT EVAL LOW COMPLEX 20 MIN: CPT | Mod: PN

## 2022-03-22 PROCEDURE — 97140 MANUAL THERAPY 1/> REGIONS: CPT | Mod: PN

## 2022-03-22 NOTE — PLAN OF CARE
"[facility]   Physical Therapy Initial Evaluation     Date: 3/22/2022   Name: Keren Sky  Clinic Number: [id number]    Therapy Diagnosis:   Encounter Diagnoses   Name Primary?    Limited joint range of motion (ROM) Yes    Shoulder weakness     Rotator cuff syndrome of right shoulder      Physician: Jose Angel Isaac,*    Physician Orders: PT Eval and Treat   Medical Diagnosis from Referral: Rotator cuff syndrome of right shoulder  Evaluation Date: 3/22/2022  Authorization Period Expiration: 12/31/2022  Plan of Care Expiration: 5/5/2022  Progress Note Due: 5/5/2022  Visit # / Visits authorized: 1/ 1   FOTO: Next survey due week of 4/4/2022    Precautions: Standard     Time In: 1450  Time Out: 1530  Total Appointment Time (timed & untimed codes): 38 minutes      Subjective     Date of onset: January 2022    History of current condition - Keren reports: she was in her usual state of health until about 3 months ago when she developed insidious onset R shoulder pain.  Progressively worsened.  Went to ortho.  Thought to have a full thickness rotator cuff tear.  Underwent arthroscopy 3/11/2022 but was found to not have the rotator cuff tear but did have an impingement.  Received debridement and subacromial decompression.  No complications.  Had follow up appointment with MD yesterday.  Discontinued sling and referred to PT.      Falls: None    Imaging, MRI studies: R shoulder without contrast 1/26/2022: "1.  Mild tendinosis of the supraspinatus tendon with focal full-thickness tear of the distal tendon at the footprint with an intrasubstance component.  2.  There is associated fluid in the subacromion/subdeltoid bursa.  3.  Mild tendinosis of the infraspinatus tendon.  4.  Moderate degenerative changes of the AC joint with small undersurface osteophytes."    Prior Therapy: Yes for carpal tunnel syndrome  Social History:  lives with their spouse, in 1 story home with steps to enter.  "   Occupation: Full time nurse at hospital (OR nurse)  Prior Level of Function: Independent in self care activities, housework, yard work, nursing activities (assisting to move patients, starting IVs etc)    Current Level of Function: Not able to work , unable to reach above shoulder level with R UE, difficulty with washing hair, some difficulty with upper body dressing.  Patient is right handed.  Not performing yard work or housework.  Sleep is disturbed (currenlty sleeping in recliner)    Pain:  Current 3/10, worst 6/10, best 3/10   Location: right anterior shoulder  Description: constant soreness/achiness, with intermittent sharp pain.    Aggravating Factors: trying to reach up, rolling toward R  Easing Factors: ibuprofen/tylenol, ice    Patients goals: to wash my hair, to get back to work.       Medical History:   Past Medical History:   Diagnosis Date    ADHD     Thyroid disease        Surgical History:   Keren Sky  has a past surgical history that includes Oophorectomy; Breast surgery; Appendectomy; Carpal tunnel release; Arthroscopic acromioplasty of shoulder (3/11/2022); Arthroscopic debridement of shoulder (3/11/2022); and Arthroscopy of shoulder with decompression of subacromial space (3/11/2022).    Medications:   Keren has a current medication list which includes the following prescription(s): acetaminophen, cyclobenzaprine, dextroamphetamine-amphetamine, docusate sodium, ibuprofen, levothyroxine, multivitamin, ondansetron, and oxycodone-acetaminophen.    Allergies:   Review of patient's allergies indicates:  No Known Allergies       OBJECTIVE     Posture: Standing: pelvis and shoulders are level, Minimal rounded shoulder and forward head posture.  Sitting:  Flattened lumbar lordosis, increased thoracic kyphosis, minimal to moderate rounded shoulder and forward head posture.    Palpation: Significant tenderness R anterior shoulder, lateral shoulder, axillary region.  Palpable thickening of tissue  around R pectoralis region.  Tenderness over distal biceps (bruising persists)   Sensation: No paresthesias reported  DTRs:  Range of Motion/Strength:    Shoulder  Right   Left  Pain/Dysfunction with Movement    AROM PROM MMT AROM PROM MMT    flexion  93*  118*  N/T  180*  180*  4+/5    extension  40*  45*  N/T  60*  70*  4+/5    abduction  40*  90*  N/T  180*  180*  4+/5    adduction  20*  25*  N/T  35*  40*  4+/5    Internal rotation  to body  N/T  N/T  70*  75*  4+/5    ER at 90° abd  N/T  N/T   N/T  90*  95*  4+/5    ER at 0° abd  40*  50*  N/T  65*  70*  4+/5    L elbow and wrist WNL Strength 4+/5  R elbow and wrist WNL. Strength 35 (not tested with resistance.)    Flexibility: Upper traps, levator scap and pectoralis tightness present  Gait: Without AD  Analysis: decreased R arm swing  Bed Mobility:Independent but guarded  Transfers: Independent  Special Tests: N/A  Other: Joint play:  GH joint intact and symmetrical B.  AC joint unable to fully assess        Limitation/Restriction for FOTO Shoulder Survey    Therapist reviewed FOTO scores for Keren Sky on 3/22/2022.   FOTO documents entered into [employer] - see Media section.    Limitation Score: 56%         TREATMENT     Total Treatment time (time-based codes) separate from Evaluation: 11 minutes      Keren received the treatments listed below:      therapeutic exercises to develop ROM for 3 minutes including:   Instructed pt in pendulum exercises:  Flex/ext, abd/add    manual therapy techniques: PROM were applied to the: R shoulder for 8 minutes, including:   PROM to R shoulder with patient in supine position      PATIENT EDUCATION AND HOME EXERCISES     Education provided:   - Instructed pt in pendulum exercises.  Discussed role of PT and proposed POC    Written Home Exercises Provided: To be issued during subsequent visits. Exercises were reviewed and Keren was able to demonstrate them prior to the end of the session.  Keren demonstrated good   understanding of the education provided. See EMR under Patient Instructions for exercises provided during therapy sessions.    ASSESSMENT     Keren is a 51 y.o. female referred to outpatient Physical Therapy with a medical diagnosis of  Rotator cuff syndrome of right shoulder. Patient presents with increased R shoulder pain, impaired posture, decreased R shoulder ROM, increased tenderness, decreased strength of R shoulder.  These problems contribute to decreased functional use of R UE for self care, driving, home care, work activities, and recreational activities.  She should benefit from skilled PT services to address these problems in order to minimize functional deficits in order to facilitate return to work    Patient prognosis is Good.   Patient will benefit from skilled outpatient Physical Therapy to address the deficits stated above and in the chart below, provide patient /family education, and to maximize patientt's level of independence.     Plan of care discussed with patient: Yes  Patient's spiritual, cultural and educational needs considered and patient is agreeable to the plan of care and goals as stated below:     Anticipated Barriers for therapy: None    Medical Necessity is demonstrated by the following  History  Co-morbidities and personal factors that may impact the plan of care Co-morbidities:   difficulty sleeping    Personal Factors:   no deficits     low   Examination  Body Structures and Functions, activity limitations and participation restrictions that may impact the plan of care Body Regions:   upper extremities  trunk    Body Systems:    ROM  strength    Participation Restrictions:   None anticipated     Activity limitations:   Learning and applying knowledge  no deficits    General Tasks and Commands  no deficits    Communication  no deficits    Mobility  lifting and carrying objects  walking  driving (bike, car, motorcycle)    Self care  washing oneself (bathing, drying, washing  hands)  caring for body parts (brushing teeth, shaving, grooming)  toileting  dressing    Domestic Life  shopping  cooking  doing house work (cleaning house, washing dishes, laundry)  assisting others    Interactions/Relationships  intimate relationships    Life Areas  employment    Community and Social Life  community life  recreation and leisure         high   Clinical Presentation evolving clinical presentation with changing clinical characteristics moderate   Decision Making/ Complexity Score: moderate     Goals:  Short Term Goals: 3 weeks    1) Decrease max pain to < 5/10   2) Facilitate improved upper quadrant flexibility   3) Decrease tenderness to minimal   4) Patient will consistently reach to top of head using R UE to allow for ease in hair dressing.    Long Term Goals: 6 weeks    1) Achieve > 75% of normal AROM of R shoulder to allow reaching to 2nd shelf in cabinet   2) Patient will sleep > 6 hours per night without disturbance by shoulder pain   3) Patient will resume light housework   4) Patient will consistently perform all self care activities without restriction   5) Decrease functional deficit to < 40% as indicated by FOTO shoulder survey   6) Patient will be independent in HEP for ROM and light strengthening    PLAN   Plan of care Certification: 3/22/2022 to 5/5/2022.    Outpatient Physical Therapy 2 times weekly for 6 weeks to include the following interventions: Electrical Stimulation IFES, Manual Therapy, Moist Heat/ Ice, Patient Education, Therapeutic Exercise and Therapeutic taping as needed.     Laura Marcum, PT      I CERTIFY THE NEED FOR THESE SERVICES FURNISHED UNDER THIS PLAN OF TREATMENT AND WHILE UNDER MY CARE   Physician's comments:     Physician's Signature: ___________________________________________________

## 2022-03-24 ENCOUNTER — CLINICAL SUPPORT (OUTPATIENT)
Dept: REHABILITATION | Facility: HOSPITAL | Age: 52
End: 2022-03-24
Attending: ORTHOPAEDIC SURGERY
Payer: COMMERCIAL

## 2022-03-24 DIAGNOSIS — M75.101 ROTATOR CUFF SYNDROME OF RIGHT SHOULDER: ICD-10-CM

## 2022-03-24 DIAGNOSIS — M25.60 LIMITED JOINT RANGE OF MOTION (ROM): Primary | ICD-10-CM

## 2022-03-24 DIAGNOSIS — R29.898 SHOULDER WEAKNESS: ICD-10-CM

## 2022-03-24 PROCEDURE — 97110 THERAPEUTIC EXERCISES: CPT | Mod: PN

## 2022-03-24 PROCEDURE — 97140 MANUAL THERAPY 1/> REGIONS: CPT | Mod: PN

## 2022-03-29 ENCOUNTER — CLINICAL SUPPORT (OUTPATIENT)
Dept: REHABILITATION | Facility: HOSPITAL | Age: 52
End: 2022-03-29
Attending: ORTHOPAEDIC SURGERY
Payer: COMMERCIAL

## 2022-03-29 DIAGNOSIS — M25.60 LIMITED JOINT RANGE OF MOTION (ROM): Primary | ICD-10-CM

## 2022-03-29 DIAGNOSIS — R29.898 SHOULDER WEAKNESS: ICD-10-CM

## 2022-03-29 DIAGNOSIS — M75.101 ROTATOR CUFF SYNDROME OF RIGHT SHOULDER: ICD-10-CM

## 2022-03-29 PROCEDURE — 97110 THERAPEUTIC EXERCISES: CPT | Mod: PN

## 2022-03-29 PROCEDURE — 97140 MANUAL THERAPY 1/> REGIONS: CPT | Mod: PN

## 2022-03-29 NOTE — PROGRESS NOTES
"[facility]   Physical Therapy Treatment Note     Name: Keren Huertamo  Clinic Number: [id number]    Therapy Diagnosis:   Encounter Diagnoses   Name Primary?    Limited joint range of motion (ROM) Yes    Shoulder weakness     Rotator cuff syndrome of right shoulder      Physician: Jose Angel Isaac,*    Visit Date: 3/29/2022    Physician Orders: PT Eval and Treat   Medical Diagnosis from Referral: Rotator cuff syndrome of right shoulder  Evaluation Date: 3/22/2022  Authorization Period Expiration: 12/31/2022  Plan of Care Expiration: 5/5/2022  Progress Note Due: 5/5/2022  Visit # / Visits authorized: 3/ 12   FOTO: Next survey due week of 4/4/2022    Precautions: Standard     PTA Visit #: 0/5     Time In: 1540  Time Out: 1628  Total Billable Time: 42 minutes    SUBJECTIVE     Pt reports: "It was really hurting last night.  I didn't do much yesterday to trigger that"  Also reported decreased discomfort following therapy session.    She was compliant with home exercise program.  Response to previous treatment: hurting  Functional change: Decreased difficulty with upper body dressing    Pain: 5/10  Location: right anterolateral shoulder      OBJECTIVE     Objective Measures updated at progress report unless specified.     Treatment     Keren received the treatments listed below:      therapeutic exercises to develop strength and ROM for 32 minutes including:   Pulley assisted ROM x 2' ea    Flexion    Abduction   Wall climbs x 10   Shoulder shrugs x 10   Scapular retraction x 10   Submax isometric exercise x 10 ea    Flexion    Extension    Abduction    Adduction    External rotation    Internal rotation   Supine wand exercises 2x10 ea    Flexion    Serratus punch   Sitting    Abduction    ER/IR    manual therapy techniques: Joint mobilizations and PROM were applied to the: R shoulder for 10 minutes, including:   Grade II superior to inferior glide, anterior to posterior glide, lateral " distraction to R GH joint   PROM to R shoulder   STM to R pectoralis muscle and to R serratus anterior    Cold pack to R shoulder deferred this date.        Patient Education and Home Exercises     Home Exercises Provided and Patient Education Provided     Education provided:   - Discussed shoulder mechanics, discussed possible modifications to sleeping position to facilitate improved comfort.  Instructed pt to try moist heat prior to going to bed to reduce muscle tension and discomfort.     Written Home Exercises Provided: Instructed patient to continue prior HEP. Exercises were reviewed and Keren was able to demonstrate them prior to the end of the session.  Keren demonstrated good  understanding of the education provided. See EMR under Patient Instructions for exercises provided during therapy sessions    ASSESSMENT     Increased PROM of R shoulder achieved today with decreased complaints of discomfort.  Tolerated initiation of AAROM with minimal increase in pain.      Keren Is progressing well towards her goals.   Pt prognosis is Good.     Pt will continue to benefit from skilled outpatient physical therapy to address the deficits listed in the problem list box on initial evaluation, provide pt/family education and to maximize pt's level of independence in the home and community environment.     Pt's spiritual, cultural and educational needs considered and pt agreeable to plan of care and goals.     Anticipated barriers to physical therapy: None    Goals:   Short Term Goals: 3 weeks    1) Decrease max pain to < 5/10 Minimal progress   2) Facilitate improved upper quadrant flexibility Initiated   3) Decrease tenderness to minimal Minimal progress   4) Patient will consistently reach to top of head using R UE to allow for ease in hair dressing. Initiated    Long Term Goals: 6 weeks    1) Achieve > 75% of normal AROM of R shoulder to allow reaching to 2nd shelf in cabinet Minimal progress   2) Patient will sleep >  6 hours per night without disturbance by shoulder pain  Ongoing   3) Patient will resume light housework  Ongoing   4) Patient will consistently perform all self care activities without restriction Minimal progress   5) Decrease functional deficit to < 40% as indicated by FOTO shoulder survey Ongoing   6) Patient will be independent in HEP for ROM and light strengthening   Progressing      PLAN     Instruct pt in upper traps and levator scap stretches.        Laura Marcum, PT

## 2022-04-07 ENCOUNTER — CLINICAL SUPPORT (OUTPATIENT)
Dept: REHABILITATION | Facility: HOSPITAL | Age: 52
End: 2022-04-07
Attending: ORTHOPAEDIC SURGERY
Payer: COMMERCIAL

## 2022-04-07 DIAGNOSIS — R29.898 SHOULDER WEAKNESS: ICD-10-CM

## 2022-04-07 DIAGNOSIS — M75.101 ROTATOR CUFF SYNDROME OF RIGHT SHOULDER: ICD-10-CM

## 2022-04-07 DIAGNOSIS — M25.60 LIMITED JOINT RANGE OF MOTION (ROM): Primary | ICD-10-CM

## 2022-04-07 PROCEDURE — 97110 THERAPEUTIC EXERCISES: CPT | Mod: PN

## 2022-04-07 PROCEDURE — 97140 MANUAL THERAPY 1/> REGIONS: CPT | Mod: PN

## 2022-04-07 NOTE — PROGRESS NOTES
"[facility]   Physical Therapy Treatment Note     Name: Keren Domínguezelmo  Clinic Number: [id number]    Therapy Diagnosis:   Encounter Diagnoses   Name Primary?    Limited joint range of motion (ROM) Yes    Shoulder weakness     Rotator cuff syndrome of right shoulder      Physician: Jose Angel Isaac,*    Visit Date: 4/7/2022    Physician Orders: PT Eval and Treat   Medical Diagnosis from Referral: Rotator cuff syndrome of right shoulder  Evaluation Date: 3/22/2022  Authorization Period Expiration: 12/31/2022  Plan of Care Expiration: 5/5/2022  Progress Note Due: 5/5/2022  Visit # / Visits authorized: 3/ 12 (4 total)  FOTO: Survey completed 4/7/2022  41% residual deficit   Next survey due week of 4/25/2022    Precautions: Standard     PTA Visit #: 0/5     Time In: 1407  Time Out: 1500  Total Billable Time: 42 minutes    SUBJECTIVE     Pt reports: "Two of the incisions look really good but one not so much. It had a big scab that fell off and it bled"  She was compliant with home exercise program.  Response to previous treatment: hurting  Functional change: Decreased difficulty with upper body dressing    Pain: 4/10  Location: right anterolateral shoulder      OBJECTIVE     Objective Measures updated at progress report unless specified.     Treatment     Keren received the treatments listed below:      therapeutic exercises to develop strength and ROM for 33 minutes including:   Pulley assisted ROM x 2' ea    Flexion    Abduction   Wall climbs 2 x 10   Shoulder shrugs x 10   Scapular retraction x 10   Upper traps stretch 3x30s R   Levator scap stretch 3x30s R   Submax isometric exercise 2 x 10 ea    Flexion    Extension    Abduction    Adduction    External rotation    Internal rotation   Supine wand exercises 2x10 ea    Flexion    Serratus punch   Sitting    Abduction    ER/IR    manual therapy techniques: Joint mobilizations and PROM were applied to the: R shoulder for 9 minutes, " including:   Grade II superior to inferior glide, anterior to posterior glide, lateral distraction to R GH joint   PROM to R shoulder   STM to R pectoralis muscle and to R serratus anterior    Cold pack to R shoulder for 10 minutes following manual therapy.        Patient Education and Home Exercises     Home Exercises Provided and Patient Education Provided     Education provided:   - Added upper traps and levator scap stretches.      Written Home Exercises Provided: Instructed patient to continue prior HEP. Exercises were reviewed and Keren was able to demonstrate them prior to the end of the session.  Keren demonstrated good  understanding of the education provided. See EMR under Patient Instructions for exercises provided during therapy sessions    ASSESSMENT     ROM improving but remains somewhat painful.  Tolerated additional exercises fairly well.      Keren Is progressing well towards her goals.   Pt prognosis is Good.     Pt will continue to benefit from skilled outpatient physical therapy to address the deficits listed in the problem list box on initial evaluation, provide pt/family education and to maximize pt's level of independence in the home and community environment.     Pt's spiritual, cultural and educational needs considered and pt agreeable to plan of care and goals.     Anticipated barriers to physical therapy: None    Goals:   Short Term Goals: 3 weeks    1) Decrease max pain to < 5/10 Minimal progress   2) Facilitate improved upper quadrant flexibility Initiated   3) Decrease tenderness to minimal Minimal progress   4) Patient will consistently reach to top of head using R UE to allow for ease in hair dressing. Progressing    Long Term Goals: 6 weeks    1) Achieve > 75% of normal AROM of R shoulder to allow reaching to 2nd shelf in cabinet Minimal progress   2) Patient will sleep > 6 hours per night without disturbance by shoulder pain  Minimal progress   3) Patient will resume light housework   Ongoing   4) Patient will consistently perform all self care activities without restriction Minimal progress   5) Decrease functional deficit to < 40% as indicated by FOTO shoulder survey Progressing/Modified 4/7/2022:  Decrease functional deficit to < 30% as indicated by FOTO shoulder survey   6) Patient will be independent in HEP for ROM and light strengthening   Progressing      PLAN     Progress flexibility, ROM and strengthening exercises as patient tolerates.         Laura Marcum, PT

## 2022-04-12 ENCOUNTER — CLINICAL SUPPORT (OUTPATIENT)
Dept: REHABILITATION | Facility: HOSPITAL | Age: 52
End: 2022-04-12
Attending: ORTHOPAEDIC SURGERY
Payer: COMMERCIAL

## 2022-04-12 DIAGNOSIS — M75.101 ROTATOR CUFF SYNDROME OF RIGHT SHOULDER: ICD-10-CM

## 2022-04-12 DIAGNOSIS — M25.60 LIMITED JOINT RANGE OF MOTION (ROM): Primary | ICD-10-CM

## 2022-04-12 DIAGNOSIS — R29.898 SHOULDER WEAKNESS: ICD-10-CM

## 2022-04-12 PROCEDURE — 97110 THERAPEUTIC EXERCISES: CPT | Mod: PN

## 2022-04-12 PROCEDURE — 97140 MANUAL THERAPY 1/> REGIONS: CPT | Mod: PN

## 2022-04-12 NOTE — PROGRESS NOTES
"[facility]   Physical Therapy Treatment Note     Name: Keren Huertamo  Clinic Number: [id number]    Therapy Diagnosis:   Encounter Diagnoses   Name Primary?    Limited joint range of motion (ROM) Yes    Shoulder weakness     Rotator cuff syndrome of right shoulder      Physician: Jose Angel Isaac,*    Visit Date: 4/12/2022    Physician Orders: PT Eval and Treat   Medical Diagnosis from Referral: Rotator cuff syndrome of right shoulder  Evaluation Date: 3/22/2022  Authorization Period Expiration: 12/31/2022  Plan of Care Expiration: 5/5/2022  Progress Note Due: 5/5/2022  Visit # / Visits authorized: 4/ 12 (5 total)  FOTO: Survey completed 4/7/2022  41% residual deficit   Next survey due week of 4/25/2022    Precautions: Standard     PTA Visit #: 0/5     Time In: 0950  Time Out: 1053  Total Billable Time: 50 minutes    SUBJECTIVE     Pt reports: "I really didn't want to miss today.  I'm stiff and hurting"  She was compliant with home exercise program.  Response to previous treatment: Sore  Functional change:     Pain: 6/10  Location: right anterolateral shoulder      OBJECTIVE     Objective Measures updated at progress report unless specified.     Treatment     Keren received the treatments listed below:      therapeutic exercises to develop strength and ROM for 40 minutes including (new exercises indicated in bold print):   Pulley assisted ROM x 2' ea    Flexion    Abduction   Wall climbs 2 x 10   Shoulder shrugs 2 x 10   Scapular retraction 2 x 10   Upper traps stretch 3x30s R   Levator scap stretch 3x30s R   Standing wand exercises 1# 2x10 ea    Flexion    Abduction    ER/IR    Extension    IR behind back    Supine wand exercises 2x10 ea    Serratus punch    ER/IR @ 90* horizontal abd   Supine butterfly (shoulder rotation with abd)     Did not perform this date (progressed to AROM)    Submax isometric exercise 2 x 10 " ea    Flexion    Extension    Abduction    Adduction    External rotation    Internal rotation    manual therapy techniques: Joint mobilizations and PROM were applied to the: R shoulder for 10 minutes, including:   Grade II/III superior to inferior glide, anterior to posterior glide, lateral distraction to R GH joint   PROM to R shoulder   STM to R lateral deltoid    Cold pack to R shoulder for 10 minutes following manual therapy.      Patient Education and Home Exercises     Home Exercises Provided and Patient Education Provided     Education provided:   - Added exercises as indicated in bold print above.      Written Home Exercises Provided: Instructed patient to continue prior HEP. Exercises were reviewed and Keren was able to demonstrate them prior to the end of the session.  Keren demonstrated good  understanding of the education provided. See EMR under Patient Instructions for exercises provided during therapy sessions    ASSESSMENT     End range pain reported with PROM.  Tolerated exercises well but experienced muscle spasms while laying supine receiving cold pack.  Transitioned to sitting and muscle spasms decreased     Keren Is progressing well towards her goals.   Pt prognosis is Good.     Pt will continue to benefit from skilled outpatient physical therapy to address the deficits listed in the problem list box on initial evaluation, provide pt/family education and to maximize pt's level of independence in the home and community environment.     Pt's spiritual, cultural and educational needs considered and pt agreeable to plan of care and goals.     Anticipated barriers to physical therapy: None    Goals:   Short Term Goals: 3 weeks    1) Decrease max pain to < 5/10 Minimal progress   2) Facilitate improved upper quadrant flexibility Progressing   3) Decrease tenderness to minimal Minimal progress   4) Patient will consistently reach to top of head using R UE to allow for ease in hair dressing.  Progressing    Long Term Goals: 6 weeks    1) Achieve > 75% of normal AROM of R shoulder to allow reaching to 2nd shelf in cabinet Progressing   2) Patient will sleep > 6 hours per night without disturbance by shoulder pain  Minimal progress   3) Patient will resume light housework  Minimal progress   4) Patient will consistently perform all self care activities without restriction Progressing   5) Decrease functional deficit to < 40% as indicated by FOTO shoulder survey Progressing/Modified 4/7/2022:  Decrease functional deficit to < 30% as indicated by FOTO shoulder survey   6) Patient will be independent in HEP for ROM and light strengthening   Progressing      PLAN     Transition from isometric exercises to PREs.          Laura Marcum, PT

## 2022-04-14 ENCOUNTER — CLINICAL SUPPORT (OUTPATIENT)
Dept: REHABILITATION | Facility: HOSPITAL | Age: 52
End: 2022-04-14
Attending: ORTHOPAEDIC SURGERY
Payer: COMMERCIAL

## 2022-04-14 DIAGNOSIS — M75.101 ROTATOR CUFF SYNDROME OF RIGHT SHOULDER: ICD-10-CM

## 2022-04-14 DIAGNOSIS — M25.60 LIMITED JOINT RANGE OF MOTION (ROM): Primary | ICD-10-CM

## 2022-04-14 DIAGNOSIS — R29.898 SHOULDER WEAKNESS: ICD-10-CM

## 2022-04-14 PROCEDURE — 97110 THERAPEUTIC EXERCISES: CPT | Mod: PN

## 2022-04-14 PROCEDURE — 97140 MANUAL THERAPY 1/> REGIONS: CPT | Mod: PN

## 2022-04-14 NOTE — PROGRESS NOTES
"[facility]   Physical Therapy Treatment Note     Name: Keren Huertamo  Clinic Number: [id number]    Therapy Diagnosis:   Encounter Diagnoses   Name Primary?    Limited joint range of motion (ROM) Yes    Shoulder weakness     Rotator cuff syndrome of right shoulder      Physician: Jose Angel Isaac,*    Visit Date: 4/14/2022    Physician Orders: PT Eval and Treat   Medical Diagnosis from Referral: Rotator cuff syndrome of right shoulder  Evaluation Date: 3/22/2022  Authorization Period Expiration: 12/31/2022  Plan of Care Expiration: 5/5/2022  Progress Note Due: 5/5/2022  Visit # / Visits authorized: 5/ 12 (6 total)  FOTO: Survey completed 4/7/2022  41% residual deficit   Next survey due week of 4/25/2022    Precautions: Standard     PTA Visit #: 0/5     Time In: 1452  Time Out: 1543  Total Billable Time: 48  minutes    SUBJECTIVE     Pt reports: "It's sore"  She was compliant with home exercise program.  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     Treatment     Keren received the treatments listed below:      therapeutic exercises to develop strength and ROM for 37 minutes including (new exercises indicated in bold print):   UBE L2 4' fwd/4' backward   Pulley assisted ROM x 2' ea    Flexion    Abduction    IR behind back x 1'   Wall climbs 2 x 10   Plyoball toss 1/2 kg ball x 10 (B UE)   Standing wand exercises 1# 2x10 ea    Flexion    Abduction    ER/IR    Extension    IR behind back    Theraband resisted shoulder exercises with red theraband 2x10    Extension    Rows    Supine wand exercises 2x10 ea    Serratus punch    ER/IR @ 90* horizontal abd   Supine butterfly (shoulder rotation with abd)    Prone shoulder exercises 2x10 " ea    Extension    Rows     Did not perform this date (progressed to AROM)    Shoulder shrugs 2 x 10   Scapular retraction 2 x 10   Upper traps stretch 3x30s R   Levator scap stretch 3x30s R      manual therapy techniques: Joint mobilizations and PROM were applied to the: R shoulder for 11 minutes, including:   Grade II/III superior to inferior glide, anterior to posterior glide, lateral distraction to R GH joint   PROM to R shoulder   Scapular scouring in prone and sidelying    Patient deferred CP this date    Patient Education and Home Exercises     Home Exercises Provided and Patient Education Provided     Education provided:   - Added exercises as indicated in bold print above.      Written Home Exercises Provided: Instructed patient to continue prior HEP. Exercises were reviewed and Keren was able to demonstrate them prior to the end of the session.  Keren demonstrated good  understanding of the education provided. See EMR under Patient Instructions for exercises provided during therapy sessions    ASSESSMENT     Crepitus noted with scapular scouring but decreased following scouring.  End range discomfort with flexion, abduction, ER, and IR.  Strength improving.      Keren Is progressing well towards her goals.   Pt prognosis is Good.     Pt will continue to benefit from skilled outpatient physical therapy to address the deficits listed in the problem list box on initial evaluation, provide pt/family education and to maximize pt's level of independence in the home and community environment.     Pt's spiritual, cultural and educational needs considered and pt agreeable to plan of care and goals.     Anticipated barriers to physical therapy: None    Goals:   Short Term Goals: 3 weeks    1) Decrease max pain to < 5/10 Progressing   2) Facilitate improved upper quadrant flexibility Progressing   3) Decrease tenderness to minimal Minimal progress   4) Patient will consistently reach to top of head using R UE to  allow for ease in hair dressing. Progressing    Long Term Goals: 6 weeks    1) Achieve > 75% of normal AROM of R shoulder to allow reaching to 2nd shelf in cabinet Progressing   2) Patient will sleep > 6 hours per night without disturbance by shoulder pain  Minimal progress   3) Patient will resume light housework  Minimal progress   4) Patient will consistently perform all self care activities without restriction Progressing   5) Decrease functional deficit to < 40% as indicated by FOTO shoulder survey Progressing/Modified 4/7/2022:  Decrease functional deficit to < 30% as indicated by FOTO shoulder survey   6) Patient will be independent in HEP for ROM and light strengthening   Progressing      PLAN     Progressive strengthening as patient tolerates.  Joint mobilization, scapular scouring, PROM/stretching      Laura Marcum, PT

## 2022-04-18 ENCOUNTER — PATIENT MESSAGE (OUTPATIENT)
Dept: GASTROENTEROLOGY | Facility: CLINIC | Age: 52
End: 2022-04-18
Payer: COMMERCIAL

## 2022-04-18 DIAGNOSIS — Z12.31 ENCOUNTER FOR SCREENING MAMMOGRAM FOR BREAST CANCER: Primary | ICD-10-CM

## 2022-04-19 ENCOUNTER — CLINICAL SUPPORT (OUTPATIENT)
Dept: REHABILITATION | Facility: HOSPITAL | Age: 52
End: 2022-04-19
Attending: ORTHOPAEDIC SURGERY
Payer: COMMERCIAL

## 2022-04-19 DIAGNOSIS — R29.898 SHOULDER WEAKNESS: ICD-10-CM

## 2022-04-19 DIAGNOSIS — M25.60 LIMITED JOINT RANGE OF MOTION (ROM): Primary | ICD-10-CM

## 2022-04-19 DIAGNOSIS — M75.101 ROTATOR CUFF SYNDROME OF RIGHT SHOULDER: ICD-10-CM

## 2022-04-19 PROCEDURE — 97140 MANUAL THERAPY 1/> REGIONS: CPT | Mod: PN

## 2022-04-19 PROCEDURE — 97110 THERAPEUTIC EXERCISES: CPT | Mod: PN

## 2022-04-19 NOTE — PROGRESS NOTES
"[facility]   Physical Therapy Treatment Note     Name: Keren THOMAS [facility] Number: [id number]    Therapy Diagnosis:   Encounter Diagnoses   Name Primary?    Limited joint range of motion (ROM) Yes    Shoulder weakness     Rotator cuff syndrome of right shoulder      Physician: Jose Angel Isaac,*    Visit Date: 4/19/2022    Physician Orders: PT Eval and Treat   Medical Diagnosis from Referral: Rotator cuff syndrome of right shoulder  Evaluation Date: 3/22/2022  Authorization Period Expiration: 12/31/2022  Plan of Care Expiration: 5/5/2022  Progress Note Due: 5/5/2022  Visit # / Visits authorized: 6/ 12 (7 total)  FOTO: Survey completed 4/7/2022  41% residual deficit   Next survey due week of 4/25/2022    Precautions: Standard     PTA Visit #: 0/5     Time In: 0950  Time Out: 1030  Total Billable Time: 38  minutes    SUBJECTIVE     Pt reports: "I don't know what I did yesterday but it was really hurting last night  She was compliant with home exercise program.  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     Treatment     Keren received the treatments listed below:  (patient arrived late for session)    therapeutic exercises to develop strength and ROM for 30 minutes including (new exercises indicated in bold print):   Pulley assisted ROM x 2' ea    Flexion    Abduction    IR behind back x 1'   Doorway stretch 3x30s    Wall climbs 2 x 10   Plyoball toss 1/2 kg ball x 10 (B UE)   Standing wand exercises 2# 2x10 ea    Flexion    Abduction    ER/IR    Extension    IR behind back    Theraband resisted shoulder exercises with red theraband 2x10    Extension    Rows    Supine wand exercises 2# bar 2x10 ea    Serratus punch    ER/IR @ 90* horizontal " abd   Supine butterfly (shoulder rotation with abd)      Did not perform this date   UBE L2 4' fwd/4' backward   Prone shoulder exercises 2x10 ea    Extension    Rows     manual therapy techniques: Joint mobilizations and PROM were applied to the: R shoulder for 8 minutes, including:   Grade II/III superior to inferior glide, anterior to posterior glide, lateral distraction to R GH joint   PROM to R shoulder   Scapular scouring in sidelying    Patient deferred CP this date    Patient Education and Home Exercises     Home Exercises Provided and Patient Education Provided     Education provided:   - Added exercises as indicated in bold print above.      Written Home Exercises Provided: Instructed patient to continue prior HEP. Exercises were reviewed and Keren was able to demonstrate them prior to the end of the session.  Keren demonstrated good  understanding of the education provided. See EMR under Patient Instructions for exercises provided during therapy sessions    ASSESSMENT     Significant crepitus noted with scouring of R scapula but patient reported relief following.  Achieving nearly full passive R shoulder flexion and abduction but IR/ER remain limited and uncomfortable.      Keren Is progressing well towards her goals.   Pt prognosis is Good.     Pt will continue to benefit from skilled outpatient physical therapy to address the deficits listed in the problem list box on initial evaluation, provide pt/family education and to maximize pt's level of independence in the home and community environment.     Pt's spiritual, cultural and educational needs considered and pt agreeable to plan of care and goals.     Anticipated barriers to physical therapy: None    Goals:   Short Term Goals: 3 weeks    1) Decrease max pain to < 5/10 Regressed this date   2) Facilitate improved upper quadrant flexibility Progressing   3) Decrease tenderness to minimal Progressing   4) Patient will consistently reach to top of head  using R UE to allow for ease in hair dressing. Progressing    Long Term Goals: 6 weeks    1) Achieve > 75% of normal AROM of R shoulder to allow reaching to 2nd shelf in cabinet Progressing   2) Patient will sleep > 6 hours per night without disturbance by shoulder pain  Minimal progress   3) Patient will resume light housework  Minimal progress   4) Patient will consistently perform all self care activities without restriction Progressing   5) Decrease functional deficit to < 40% as indicated by FOTO shoulder survey Progressing/Modified 4/7/2022:  Decrease functional deficit to < 30% as indicated by FOTO shoulder survey   6) Patient will be independent in HEP for ROM and light strengthening   Progressing      PLAN     Instruct in body mechanics to reduce stress on R shoulder. Postural stabilization exercises.  Continue joint mobs, PROM/stretching, progressive strengthening.     Laura Marcum, PT

## 2022-04-20 ENCOUNTER — TELEPHONE (OUTPATIENT)
Dept: GASTROENTEROLOGY | Facility: CLINIC | Age: 52
End: 2022-04-20
Payer: COMMERCIAL

## 2022-04-21 ENCOUNTER — CLINICAL SUPPORT (OUTPATIENT)
Dept: REHABILITATION | Facility: HOSPITAL | Age: 52
End: 2022-04-21
Attending: ORTHOPAEDIC SURGERY
Payer: COMMERCIAL

## 2022-04-21 DIAGNOSIS — M75.101 ROTATOR CUFF SYNDROME OF RIGHT SHOULDER: ICD-10-CM

## 2022-04-21 DIAGNOSIS — R29.898 SHOULDER WEAKNESS: ICD-10-CM

## 2022-04-21 DIAGNOSIS — M25.60 LIMITED JOINT RANGE OF MOTION (ROM): Primary | ICD-10-CM

## 2022-04-21 PROCEDURE — 97140 MANUAL THERAPY 1/> REGIONS: CPT | Mod: PN

## 2022-04-21 PROCEDURE — 97110 THERAPEUTIC EXERCISES: CPT | Mod: PN

## 2022-04-26 ENCOUNTER — CLINICAL SUPPORT (OUTPATIENT)
Dept: REHABILITATION | Facility: HOSPITAL | Age: 52
End: 2022-04-26
Attending: ORTHOPAEDIC SURGERY
Payer: COMMERCIAL

## 2022-04-26 DIAGNOSIS — R29.898 SHOULDER WEAKNESS: ICD-10-CM

## 2022-04-26 DIAGNOSIS — M75.101 ROTATOR CUFF SYNDROME OF RIGHT SHOULDER: ICD-10-CM

## 2022-04-26 DIAGNOSIS — M25.60 LIMITED JOINT RANGE OF MOTION (ROM): Primary | ICD-10-CM

## 2022-04-26 PROCEDURE — 97110 THERAPEUTIC EXERCISES: CPT | Mod: PN

## 2022-04-26 PROCEDURE — 97140 MANUAL THERAPY 1/> REGIONS: CPT | Mod: PN

## 2022-04-26 NOTE — PROGRESS NOTES
"[facility]   Physical Therapy Treatment Note     Name: Keren THOMAS [facility] Number: [id number]    Therapy Diagnosis:   Encounter Diagnoses   Name Primary?    Limited joint range of motion (ROM) Yes    Shoulder weakness     Rotator cuff syndrome of right shoulder      Physician: Jose Angel Isaac,*    Visit Date: 4/26/2022    Physician Orders: PT Eval and Treat   Medical Diagnosis from Referral: Rotator cuff syndrome of right shoulder  Evaluation Date: 3/22/2022  Authorization Period Expiration: 12/31/2022  Plan of Care Expiration: 5/5/2022  Progress Note Due: 5/5/2022  Visit # / Visits authorized: 8/ 20 (9 total)  FOTO: Survey completed 4/25/2022  45% residual deficit   Next survey due week of 5/9/2022    Precautions: Standard     PTA Visit #: 0/5     Time In: 1240  Time Out: 1332  Total Billable Time: 50 minutes    SUBJECTIVE     Pt reports: "It's getting better just not as fast as I'd like it to."  She was compliant with home exercise program.  Response to previous treatment: Decreased pain  Functional change: Decreased difficulty with UE dressing (except tucking shirt in) and with washing and brushing hair.     Pain: 5/10  Location: right anterolateral shoulder      OBJECTIVE     Objective Measures updated at progress report unless specified.     Treatment     Keren received the treatments listed below:  (patient arrived 9 min late for session)    therapeutic exercises to develop strength and ROM for 40 minutes including (new exercises indicated in bold print):   UBE L3 3' fwd/3' back   Pulley assisted ROM x 2' ea    Flexion    Abduction    IR behind back x 1'   Doorway stretch 3x30s    Wall climbs with 2# wt 2 x 10   Plyoball toss 1 kg ball x 10 (B UE)   Standing wand exercises 3# 2x10 ea    Flexion    Abduction    ER/IR    Extension    IR behind back    Theraband resisted shoulder exercises with green theraband 2x10    Extension    Rows    Resisted ER with red theraband " 2x10   Shoulder abduction (to 90*) with 2# wt 2x10   Sustained shoulder elevation with 2# wt while walking 2x25'   Supine wand exercises 3# bar 2x10 ea    Serratus punch    ER/IR @ 90* horizontal abd   Supine butterfly (shoulder rotation with abd) 2x10   Prone shoulder exercises 2x10 ea    Extension    Rows     Flexion    manual therapy techniques: Joint mobilizations and PROM were applied to the: R shoulder for 10 minutes, including:   Grade II/III superior to inferior glide, anterior to posterior glide, lateral distraction to R GH joint   Grade II AC rotation   PROM to R shoulder   Scapular scouring to R scapula in prone     Cold pack to R shoulder for 7 minutes following exercise and manual therapy    Patient Education and Home Exercises     Home Exercises Provided and Patient Education Provided     Education provided:   - Added exercises as indicated in bold print above.  Discussed rehab process following shoulder surgery.    Written Home Exercises Provided: Instructed patient to continue prior HEP. Exercises were reviewed and Keren was able to demonstrate them prior to the end of the session.  Keren demonstrated good  understanding of the education provided. See EMR under Patient Instructions for exercises provided during therapy sessions    ASSESSMENT     Significant crepitus noted with scapular scouring but this decreased following manual therapy.    Keren Is progressing well towards her goals.   Pt prognosis is Good.     Pt will continue to benefit from skilled outpatient physical therapy to address the deficits listed in the problem list box on initial evaluation, provide pt/family education and to maximize pt's level of independence in the home and community environment.     Pt's spiritual, cultural and educational needs considered and pt agreeable to plan of care and goals.     Anticipated barriers to physical therapy: None    Goals:   Short Term Goals: 3 weeks    1) Decrease max pain to < 5/10  Progressing   2) Facilitate improved upper quadrant flexibility Progressing   3) Decrease tenderness to minimal Progressing   4) Patient will consistently reach to top of head using R UE to allow for ease in hair dressing. Progressing/nearly met    Long Term Goals: 6 weeks    1) Achieve > 75% of normal AROM of R shoulder to allow reaching to 2nd shelf in cabinet Progressing   2) Patient will sleep > 6 hours per night without disturbance by shoulder pain  Minimal progress   3) Patient will resume light housework  Minimal progress   4) Patient will consistently perform all self care activities without restriction Progressing   5) Decrease functional deficit to < 40% as indicated by FOTO shoulder survey Progressing/Modified 4/7/2022:  Decrease functional deficit to < 30% as indicated by FOTO shoulder survey Ongoing   6) Patient will be independent in HEP for ROM and light strengthening   Progressing      PLAN     Continue with scapular scouring to reduce crepitus and improve scapulothoracic rhythm, Gentle AC mobilization.  Progressive strengthening.     Laura Marcum, PT

## 2022-04-28 ENCOUNTER — CLINICAL SUPPORT (OUTPATIENT)
Dept: REHABILITATION | Facility: HOSPITAL | Age: 52
End: 2022-04-28
Attending: ORTHOPAEDIC SURGERY
Payer: COMMERCIAL

## 2022-04-28 DIAGNOSIS — M75.101 ROTATOR CUFF SYNDROME OF RIGHT SHOULDER: ICD-10-CM

## 2022-04-28 DIAGNOSIS — M25.60 LIMITED JOINT RANGE OF MOTION (ROM): Primary | ICD-10-CM

## 2022-04-28 DIAGNOSIS — R29.898 SHOULDER WEAKNESS: ICD-10-CM

## 2022-04-28 PROCEDURE — 97140 MANUAL THERAPY 1/> REGIONS: CPT | Mod: PN

## 2022-04-28 PROCEDURE — 97110 THERAPEUTIC EXERCISES: CPT | Mod: PN

## 2022-04-28 NOTE — PROGRESS NOTES
"[facility]   Physical Therapy Treatment Note     Name: Keren THOMAS [facility] Number: [id number]    Therapy Diagnosis:   Encounter Diagnoses   Name Primary?    Limited joint range of motion (ROM) Yes    Shoulder weakness     Rotator cuff syndrome of right shoulder      Physician: Jose Angel Isaac,*    Visit Date: 4/28/2022    Physician Orders: PT Eval and Treat   Medical Diagnosis from Referral: Rotator cuff syndrome of right shoulder  Evaluation Date: 3/22/2022  Authorization Period Expiration: 12/31/2022  Plan of Care Expiration: 5/5/2022  Progress Note Due: 5/5/2022  Visit # / Visits authorized: 9/ 20 (10 total)  FOTO: Survey completed 4/25/2022  45% residual deficit   Next survey due week of 5/9/2022    Precautions: Standard     PTA Visit #: 0/5     Time In: 1245  Time Out: 1333  Total Billable Time: 45 minutes    SUBJECTIVE     Pt reports: "It's tender right here" (indicating R upper deltoid)  She was compliant with home exercise program.  Response to previous treatment: Sore but a good sore  Functional change: Still have problems reaching back     Pain: 5/10  Location: right anterolateral shoulder      OBJECTIVE     Objective Measures updated at progress report unless specified.     Treatment     Keren received the treatments listed below:      therapeutic exercises to develop strength and ROM for 34 minutes including (new exercises indicated in bold print):   UBE L3 3' fwd/3' back   Pulley assisted ROM x 2' ea    Flexion    Abduction    IR behind back x 1'   Doorway stretch 3x30s    Wall climbs with 2# wt 2 x 10   Plyoball toss 1 kg ball x 10 (B UE)   Standing wand exercises 3# 2x10 ea    Flexion    Abduction    ER/IR    Extension    IR behind back     ER/IR @ 90* abduction    Theraband resisted shoulder exercises with green theraband 2x10    Extension    Rows    Resisted ER with red theraband 2x10    Supine wand exercises 3# bar 2x10 ea    Serratus punch    ER/IR @ 90* " horizontal abd   Stick'm up 3x12s   Supine wand exercises with 3# 2x10    Serratus punch    Prone shoulder exercises 2x10 ea    Extension    Rows     Flexion    Did not perform this date   Supine butterfly (shoulder rotation with abd) 2x10   Shoulder abduction (to 90*) with 2# wt 2x10   Sustained shoulder elevation with 2# wt while walking 2x25'    manual therapy techniques: Joint mobilizations and PROM were applied to the: R shoulder for 11 minutes, including:   Grade II/III superior to inferior glide, anterior to posterior glide, lateral distraction to R GH joint   Grade II AC rotation   PROM to R shoulder   Scapular scouring to R scapula in prone     Patient Education and Home Exercises     Home Exercises Provided and Patient Education Provided     Education provided:   - Added exercises as indicated in bold print above. Modified ER/IR @ 90* abd to standing.     Written Home Exercises Provided: Instructed patient to continue prior HEP. Exercises were reviewed and Keren was able to demonstrate them prior to the end of the session.  Keren demonstrated good  understanding of the education provided. See EMR under Patient Instructions for exercises provided during therapy sessions    ASSESSMENT     Patient demonstrated significant difficulty with stick'm up exercise with limited tolerance due to shoulder pain.  A/PROM of R shoulder improving.  Decreased crepitus noted with scapular scouring  Keren Is progressing well towards her goals.   Pt prognosis is Good.     Pt will continue to benefit from skilled outpatient physical therapy to address the deficits listed in the problem list box on initial evaluation, provide pt/family education and to maximize pt's level of independence in the home and community environment.     Pt's spiritual, cultural and educational needs considered and pt agreeable to plan of care and goals.     Anticipated barriers to physical therapy: None    Goals:   Short Term Goals: 3 weeks    1)  Decrease max pain to < 5/10 Progressing   2) Facilitate improved upper quadrant flexibility Progressing   3) Decrease tenderness to minimal Progressing   4) Patient will consistently reach to top of head using R UE to allow for ease in hair dressing. Progressing/nearly met    Long Term Goals: 6 weeks    1) Achieve > 75% of normal AROM of R shoulder to allow reaching to 2nd shelf in cabinet Progressing   2) Patient will sleep > 6 hours per night without disturbance by shoulder pain  Minimal progress   3) Patient will resume light housework  Minimal progress   4) Patient will consistently perform all self care activities without restriction Progressing   5) Decrease functional deficit to < 40% as indicated by FOTO shoulder survey Progressing/Modified 4/7/2022:  Decrease functional deficit to < 30% as indicated by FOTO shoulder survey Ongoing   6) Patient will be independent in HEP for ROM and light strengthening   Progressing      PLAN     Attempt to increase time for stick'm up exercise.  Add resisted shoulder adduction.      Laura Marcum, PT

## 2022-05-02 ENCOUNTER — CLINICAL SUPPORT (OUTPATIENT)
Dept: REHABILITATION | Facility: HOSPITAL | Age: 52
End: 2022-05-02
Attending: ORTHOPAEDIC SURGERY
Payer: COMMERCIAL

## 2022-05-02 ENCOUNTER — OFFICE VISIT (OUTPATIENT)
Dept: ORTHOPEDICS | Facility: CLINIC | Age: 52
End: 2022-05-02
Payer: COMMERCIAL

## 2022-05-02 VITALS — RESPIRATION RATE: 18 BRPM | HEIGHT: 66 IN | WEIGHT: 140 LBS | BODY MASS INDEX: 22.5 KG/M2

## 2022-05-02 DIAGNOSIS — M75.101 ROTATOR CUFF SYNDROME OF RIGHT SHOULDER: ICD-10-CM

## 2022-05-02 DIAGNOSIS — R29.898 SHOULDER WEAKNESS: ICD-10-CM

## 2022-05-02 DIAGNOSIS — M25.60 LIMITED JOINT RANGE OF MOTION (ROM): Primary | ICD-10-CM

## 2022-05-02 DIAGNOSIS — M75.101 ROTATOR CUFF SYNDROME OF RIGHT SHOULDER: Primary | ICD-10-CM

## 2022-05-02 PROCEDURE — 99999 PR PBB SHADOW E&M-EST. PATIENT-LVL III: ICD-10-PCS | Mod: PBBFAC,,, | Performed by: ORTHOPAEDIC SURGERY

## 2022-05-02 PROCEDURE — 1159F PR MEDICATION LIST DOCUMENTED IN MEDICAL RECORD: ICD-10-PCS | Mod: CPTII,S$GLB,, | Performed by: ORTHOPAEDIC SURGERY

## 2022-05-02 PROCEDURE — 1160F RVW MEDS BY RX/DR IN RCRD: CPT | Mod: CPTII,S$GLB,, | Performed by: ORTHOPAEDIC SURGERY

## 2022-05-02 PROCEDURE — 3008F PR BODY MASS INDEX (BMI) DOCUMENTED: ICD-10-PCS | Mod: CPTII,S$GLB,, | Performed by: ORTHOPAEDIC SURGERY

## 2022-05-02 PROCEDURE — 1159F MED LIST DOCD IN RCRD: CPT | Mod: CPTII,S$GLB,, | Performed by: ORTHOPAEDIC SURGERY

## 2022-05-02 PROCEDURE — 3008F BODY MASS INDEX DOCD: CPT | Mod: CPTII,S$GLB,, | Performed by: ORTHOPAEDIC SURGERY

## 2022-05-02 PROCEDURE — 99024 PR POST-OP FOLLOW-UP VISIT: ICD-10-PCS | Mod: S$GLB,,, | Performed by: ORTHOPAEDIC SURGERY

## 2022-05-02 PROCEDURE — 1160F PR REVIEW ALL MEDS BY PRESCRIBER/CLIN PHARMACIST DOCUMENTED: ICD-10-PCS | Mod: CPTII,S$GLB,, | Performed by: ORTHOPAEDIC SURGERY

## 2022-05-02 PROCEDURE — 97140 MANUAL THERAPY 1/> REGIONS: CPT | Mod: PN

## 2022-05-02 PROCEDURE — 99024 POSTOP FOLLOW-UP VISIT: CPT | Mod: S$GLB,,, | Performed by: ORTHOPAEDIC SURGERY

## 2022-05-02 PROCEDURE — 99999 PR PBB SHADOW E&M-EST. PATIENT-LVL III: CPT | Mod: PBBFAC,,, | Performed by: ORTHOPAEDIC SURGERY

## 2022-05-02 PROCEDURE — 97110 THERAPEUTIC EXERCISES: CPT | Mod: PN

## 2022-05-02 NOTE — PROGRESS NOTES
Past Medical History:   Diagnosis Date    ADHD     Thyroid disease        Past Surgical History:   Procedure Laterality Date    APPENDECTOMY      ARTHROSCOPIC ACROMIOPLASTY OF SHOULDER  3/11/2022    Procedure: ACROMIOPLASTY, ARTHROSCOPIC;  Surgeon: Jose Angel Isaac MD;  Location: [facility] OR;  Service: Orthopedics;;    ARTHROSCOPIC DEBRIDEMENT OF SHOULDER  3/11/2022    Procedure: DEBRIDEMENT, SHOULDER, ARTHROSCOPIC;  Surgeon: Jose Angel Isaac MD;  Location: [facility] OR;  Service: Orthopedics;;    ARTHROSCOPY OF SHOULDER WITH DECOMPRESSION OF SUBACROMIAL SPACE  3/11/2022    Procedure: ARTHROSCOPY, SHOULDER, WITH SUBACROMIAL SPACE DECOMPRESSION;  Surgeon: Jose Angel Isaac MD;  Location: [facility] OR;  Service: Orthopedics;;    BREAST SURGERY      CARPAL TUNNEL RELEASE      OOPHORECTOMY         Current Outpatient Medications   Medication Sig    acetaminophen (TYLENOL) 500 MG tablet Take 2 tablets (1,000 mg total) by mouth every 8 (eight) hours as needed for Pain.    dextroamphetamine-amphetamine (ADDERALL) 12.5 MG tablet Take 30 mg by mouth once daily.    docusate sodium (COLACE) 250 MG capsule Take 250 mg by mouth once daily.    ibuprofen (ADVIL,MOTRIN) 800 MG tablet Take 1 tablet (800 mg total) by mouth every 6 (six) hours as needed for Pain.    levothyroxine (SYNTHROID) 25 MCG tablet Take 25 mcg by mouth before breakfast.    multivitamin (THERAGRAN) per tablet Take 1 tablet by mouth once daily.    cyclobenzaprine (FLEXERIL) 10 MG tablet Take 1 tablet (10 mg total) by mouth 3 (three) times daily as needed for Muscle spasms.    ondansetron (ZOFRAN-ODT) 4 MG TbDL Take 1 tablet (4 mg total) by mouth every 8 (eight) hours as needed (nausea).    oxyCODONE-acetaminophen (PERCOCET) 5-325 mg per tablet Take 1 tablet by mouth every 6 (six) hours as needed for Pain (not controlled by tylenol).     No current facility-administered medications for this visit.       Review of patient's allergies  indicates:  No Known Allergies    History reviewed. No pertinent family history.    Social History     Socioeconomic History    Marital status:    Tobacco Use    Smoking status: Former Smoker     Quit date: 2020     Years since quittin.1    Smokeless tobacco: Never Used   Substance and Sexual Activity    Alcohol use: Not Currently       Chief Complaint:   Chief Complaint   Patient presents with    Right Shoulder - Post-op Evaluation       Date of surgery:  2022    History of present illness:  51-year-old female underwent right shoulder arthroscopy.  Patient's rotator cuff ended up looking okay and underwent acromioplasty and extensive debridement.  Had a lot of bursitis as well.  She has been doing physical therapy and doing pretty well.  Pain is a 5/10.  Still has some moderate irritability and some scapular hiking.      Review of Systems:    Musculoskeletal:  See HPI        Physical Examination:    Vital Signs:    Vitals:    22 1441   Resp: 18       Body mass index is 22.6 kg/m².    This a well-developed, well nourished patient in no acute distress.  They are alert and oriented and cooperative to examination.  Pt. walks without an antalgic gait.      Examination of the right shoulder shows healed surgical incisions.  No erythema drainage. Range of motion is decent with the the ability to touch the top of her head.  Some weakness actively forward flexing still has to be expected.     X-rays:  None     Assessment::  Status post right shoulder arthroscopy with acromioplasty extensive debridement    Plan:  Reviewed the findings with her today.  Continue with the physical therapy to help with soreness and inflammation.  Follow-up in 6 weeks.  Likely will release her back to work at that point.    This note was created using [employer] voice recognition software that occasionally misinterpreted phrases or words.

## 2022-05-02 NOTE — PROGRESS NOTES
"[facility]   Physical Therapy Treatment Note     Name: Keren THOMAS [facility] Number: [id number]    Therapy Diagnosis:   Encounter Diagnoses   Name Primary?    Limited joint range of motion (ROM) Yes    Shoulder weakness     Rotator cuff syndrome of right shoulder      Physician: Jose Angel Isaac,*    Visit Date: 5/2/2022    Physician Orders: PT Eval and Treat   Medical Diagnosis from Referral: Rotator cuff syndrome of right shoulder  Evaluation Date: 3/22/2022  Authorization Period Expiration: 12/31/2022  Plan of Care Expiration: 5/5/2022  Progress Note Due: 5/5/2022  Visit # / Visits authorized: 10/ 20 (11 total)  FOTO: Survey completed 4/25/2022  45% residual deficit   Next survey due week of 5/9/2022    Precautions: Standard     PTA Visit #: 0/5     Time In: 1242  Time Out: 1333  Total Billable Time: 42 minutes    SUBJECTIVE     Pt reports: "It's better today  She was compliant with home exercise program.  Response to previous treatment: Felt better  Functional change: Decreased difficulty reaching up     Pain: 4/10  Location: right anterolateral shoulder      OBJECTIVE     Objective Measures updated at progress report unless specified.     Treatment     Keren received the treatments listed below:      therapeutic exercises to develop strength and ROM for 32 minutes including (new exercises indicated in bold print):   UBE L3 3' fwd/3' back   Pulley assisted ROM x 2' ea    Flexion    Abduction    IR behind back x 1'   Doorway stretch 3x30s    Wall climbs with 2# wt 2 x 10   Plyoball toss 1 kg ball x 10 (R UE)   Standing wand exercises 3# 2x10 ea    Flexion    Abduction    ER/IR    Extension    IR behind back     ER/IR @ 90* abduction    Theraband resisted shoulder exercises with green theraband 2x10    Extension    Rows     Shoulder adduction    Resisted ER with red theraband 2x10   Stick'm up 3x15s   Shoulder abduction (to 90*) with 2# wt 2x10   Sustained shoulder elevation " with 2# wt while walking 2x50'   Supine wand exercises 3# bar 2x10 ea    Serratus punch   Supine butterfly (shoulder rotation with abd) 2x10   Prone shoulder exercises 2x10 ea    Extension    Rows     Flexion    manual therapy techniques: Joint mobilizations and PROM were applied to the: R shoulder for 10 minutes, including:   Grade II/III superior to inferior glide, anterior to posterior glide, lateral distraction to R GH joint   Grade II AC rotation   PROM to R shoulder   Scapular scouring to R scapula in prone     Patient Education and Home Exercises     Home Exercises Provided and Patient Education Provided     Education provided:   - Added exercises as indicated in bold print above. Modified ER/IR @ 90* abd to standing.     Written Home Exercises Provided: Instructed patient to continue prior HEP. Exercises were reviewed and Keren was able to demonstrate them prior to the end of the session.  Keren demonstrated good  understanding of the education provided. See EMR under Patient Instructions for exercises provided during therapy sessions    ASSESSMENT     Decreased crepitus noted with scapular scouring.  Decreased discomfort reported with stick'm ups today.  End range limitation persists in flexion and rotation.  Limited R AC rotation persists.      Keren Is progressing well towards her goals.   Pt prognosis is Good.     Pt will continue to benefit from skilled outpatient physical therapy to address the deficits listed in the problem list box on initial evaluation, provide pt/family education and to maximize pt's level of independence in the home and community environment.     Pt's spiritual, cultural and educational needs considered and pt agreeable to plan of care and goals.     Anticipated barriers to physical therapy: None    Goals:   Short Term Goals: 3 weeks    1) Decrease max pain to < 5/10 Progressing   2) Facilitate improved upper quadrant flexibility Progressing   3) Decrease tenderness to minimal  Progressing   4) Patient will consistently reach to top of head using R UE to allow for ease in hair dressing. Progressing/nearly met    Long Term Goals: 6 weeks    1) Achieve > 75% of normal AROM of R shoulder to allow reaching to 2nd shelf in cabinet Met 5/2/2022   2) Patient will sleep > 6 hours per night without disturbance by shoulder pain  Minimal progress   3) Patient will resume light housework  Progressing   4) Patient will consistently perform all self care activities without restriction Progressing   5) Decrease functional deficit to < 40% as indicated by FOTO shoulder survey Progressing/Modified 4/7/2022:  Decrease functional deficit to < 30% as indicated by FOTO shoulder survey Ongoing   6) Patient will be independent in HEP for ROM and light strengthening   Progressing      PLAN     Progressive stretching and strengthening    Laura Marcum, PT

## 2022-05-10 ENCOUNTER — CLINICAL SUPPORT (OUTPATIENT)
Dept: REHABILITATION | Facility: HOSPITAL | Age: 52
End: 2022-05-10
Attending: ORTHOPAEDIC SURGERY
Payer: COMMERCIAL

## 2022-05-10 DIAGNOSIS — M75.101 ROTATOR CUFF SYNDROME OF RIGHT SHOULDER: ICD-10-CM

## 2022-05-10 DIAGNOSIS — R29.898 SHOULDER WEAKNESS: ICD-10-CM

## 2022-05-10 DIAGNOSIS — M25.60 LIMITED JOINT RANGE OF MOTION (ROM): Primary | ICD-10-CM

## 2022-05-10 PROCEDURE — 97140 MANUAL THERAPY 1/> REGIONS: CPT | Mod: PN

## 2022-05-10 PROCEDURE — 97110 THERAPEUTIC EXERCISES: CPT | Mod: PN

## 2022-05-10 NOTE — PLAN OF CARE
[facility]  Physical Therapy Plan of Care Note    Name: Keren THOMAS [facility] Number: [id number]    Therapy Diagnosis:   Encounter Diagnoses   Name Primary?    Limited joint range of motion (ROM) Yes    Shoulder weakness     Rotator cuff syndrome of right shoulder      Physician: Jose Angel Isaac,*    Visit Date: 5/10/2022    Physician Orders: PT Eval and Treat   Medical Diagnosis from Referral: Rotator cuff syndrome of right shoulder  Evaluation Date: 3/22/2022  Authorization Period Expiration: 12/31/2022  Plan of Care Expiration: 5/5/2022  Progress Note Due: 5/5/2022  Visit # / Visits authorized: 11/ 20   FOTO: Shoulder survey completed 4/26/2022  45% residual deficits.      Precautions: Standard     Functional Level Prior to Evaluation:  Not able to work , unable to reach above shoulder level with R UE, difficulty with washing hair, some difficulty with upper body dressing.  Patient is right handed.  Not performing yard work or housework.  Sleep is disturbed (currenlty sleeping in recliner)    SUBJECTIVE     Update:   Pain:  Current 3/10, worst 6/10, best 3/10   Location: right anterior shoulder    Current Level of Function:  Sleep remains disturbed.  Able to reach overhead using R UE but with discomfort and increased fatigue/limited ability to perform repeated/sustained reach with R UE.  Lifting with R UE limited.  Performing light housework and light yard work.    OBJECTIVE     Update:   Posture: Standing: pelvis and shoulders are level, Minimal rounded shoulder and forward head posture.  Sitting:  minimal  lumbar lordosis, increased thoracic kyphosis, minimal rounded shoulder and forward head posture.    Palpation: Minimal tenderness persists R anterior shoulder, lateral shoulder, axillary region.    Sensation: No paresthesias reported    Range of Motion/Strength:    Shoulder   Right     Left   Pain/Dysfunction with Movement     AROM PROM MMT AROM PROM MMT     flexion  135*   165*  3+/5  180*  180*  4+/5     extension  50*  55*  4/5  60*  70*  4+/5     abduction  125*  160*  3+/5  180*  180*  4+/5     adduction  25*  30*  4/5  35*  40*  4+/5     Internal rotation  45*  55*  4-/5  70*  75*  4+/5     ER at 90° abd  70*  75*  3+/5  90*  95*  4+/5     ER at 0° abd  55*  60*  3+/5  65*  70*  4+/5     L elbow and wrist WNL Strength 4+/5  R elbow and wrist WNL. Strength 4/5      Flexibility: Minimal upper traps, levator scap and pectoralis tightness present  Other: Joint play:  GH joint intact and symmetrical B.  R AC joint limited rotation (discomfort reported)     ASSESSMENT     Update: Patient is making steady progress in PT with improved A/PROM of R shoulder, increased R UE strength, decreased tenderness, decreased R shoulder pain, and improved upper quadrant flexibility.  These improvements have contributed to improved UE activity tolerance, decreased difficulty with upper body bathing/dressing, and reaching activities; however, she continues with limitations in ROM and strength that prevent her from resuming full household and yard work tasks and work related activities.  She should benefit from continued skilled PT services to address these remaining problems in order to minimize functional deficits.      Previous Short Term Goals Status:      1) Decrease max pain to < 5/10 Progressing   2) Facilitate improved upper quadrant flexibility Progressing   3) Decrease tenderness to minimal Progressing   4) Patient will consistently reach to top of head using R UE to allow for ease in hair dressing.   Progressing/nearly met     New Short Term Goals Status:      #1-4 progressing/continue   5) Patient will transition to sleeping in bed (vs recliner) at least 2-3 hours at a time    Long Term Goal Status: modified:  5/10/2022   1) Achieve > 75% of normal AROM of R shoulder to allow reaching to 2nd shelf in cabinet Met 5/2/2022              2) Patient will sleep > 6 hours per night without  disturbance by shoulder pain  Minimal progress              3) Patient will resume light housework  Met 5/10/2022 MODIFIED (5/10/2022): patient will resume regular household chores              4) Patient will consistently perform all self care activities without restriction Progressing              5) Decrease functional deficit to < 40% as indicated by FOTO shoulder survey Progressing/Modified 4/7/2022:  Decrease functional deficit to < 30% as indicated by FOTO shoulder survey Ongoing              6) Patient will be independent in HEP for ROM and light strengthening   Progressing   7) Patient will resume work activities.      Reasons for Recertification of Therapy:   Patient is making steady progress but is not ready to return to work.       PLAN     Updated Certification Period: 05/10/2022 to 6/22/2022   Recommended Treatment Plan: 2 times per week for 6 weeks:  Electrical Stimulation IFC, Manual Therapy, Moist Heat/ Ice, Patient Education, Therapeutic Activities, Therapeutic Exercise and Therapeutic Taping  Other Recommendations: N/A    Laura Marcum, PT    I CERTIFY THE NEED FOR THESE SERVICES FURNISHED UNDER THIS PLAN OF TREATMENT AND WHILE UNDER MY CARE  Physician's comments:      Physician's Signature: ___________________________________________________

## 2022-05-10 NOTE — PROGRESS NOTES
"[facility]   Physical Therapy Treatment Note     Name: Keren THOMAS [facility] Number: [id number]    Therapy Diagnosis:   Encounter Diagnoses   Name Primary?    Limited joint range of motion (ROM) Yes    Shoulder weakness     Rotator cuff syndrome of right shoulder      Physician: Jose Angel Isaac,*    Visit Date: 5/10/2022    Physician Orders: PT Eval and Treat   Medical Diagnosis from Referral: Rotator cuff syndrome of right shoulder  Evaluation Date: 3/22/2022  Authorization Period Expiration: 12/31/2022  Plan of Care Expiration: 6/22/2022  Progress Note Due: 6/22/2022  Visit # / Visits authorized: 11/ 20 (12 total)  FOTO: Survey completed 4/26/2022  45% residual deficit   Next survey due week of 5/17/2022    Precautions: Standard     PTA Visit #: 0/5     Time In: 1403  Time Out: 1448  Total Billable Time: 42 minutes    SUBJECTIVE     Pt reports: "It feels okay"   She was compliant with home exercise program.  Response to previous treatment: A little sore  Functional change: Less difficulty reaching overhead.       Pain: 4/10  Location: right anterolateral shoulder      OBJECTIVE     Objective Measures updated at progress report unless specified.     Treatment     Keren received the treatments listed below:      therapeutic exercises to develop strength and ROM for 32 minutes including (new exercises indicated in bold print):   UBE L3 4' fwd/4' back   Pulley assisted ROM x 2' ea    Flexion    Abduction    IR behind back x 1'   Doorway stretch 3x30s    Wall climbs with 2# wt 2 x 10   Plyoball toss 1 kg ball x 10 (R UE)   Standing wand exercises 3# 2x10 ea    Flexion    Abduction    ER/IR    Extension    IR behind back     ER/IR @ 90* abduction    Theraband resisted shoulder exercises with green theraband 2x10    Extension    Rows     Shoulder adduction    Resisted ER with red theraband 2x10   Stick'm up 3x15s   Shoulder abduction (to 90*) with 2# wt 2x10   Sustained shoulder " elevation with 2# wt while walking 2x50'   Supine wand exercises 3# bar 2x10 ea    Serratus punch   Supine butterfly (shoulder rotation with abd) 2x10   Prone shoulder exercises 2x10 ea    Extension    Rows     Flexion    manual therapy techniques: Joint mobilizations and PROM were applied to the: R shoulder for 10 minutes, including:   Grade II/III superior to inferior glide, anterior to posterior glide, lateral distraction to R GH joint   Grade II AC rotation   PROM to R shoulder   Scapular scouring to R scapula in prone     Patient Education and Home Exercises     Home Exercises Provided and Patient Education Provided     Education provided:   -      Written Home Exercises Provided: Instructed patient to continue prior HEP. Exercises were reviewed and Keren was able to demonstrate them prior to the end of the session.  Keren demonstrated good  understanding of the education provided. See EMR under Patient Instructions for exercises provided during therapy sessions    ASSESSMENT     See POC update     Keren Is progressing well towards her goals.   Pt prognosis is Good.     Pt will continue to benefit from skilled outpatient physical therapy to address the deficits listed in the problem list box on initial evaluation, provide pt/family education and to maximize pt's level of independence in the home and community environment.     Pt's spiritual, cultural and educational needs considered and pt agreeable to plan of care and goals.     Anticipated barriers to physical therapy: None    Goals:   Short Term Goals: 3 weeks    1) Decrease max pain to < 5/10 Progressing   2) Facilitate improved upper quadrant flexibility Progressing   3) Decrease tenderness to minimal Progressing   4) Patient will consistently reach to top of head using R UE to allow for ease in hair dressing. Progressing/nearly met    Long Term Goals: 6 weeks    1) Achieve > 75% of normal AROM of R shoulder to allow reaching to 2nd shelf in cabinet Met  5/2/2022   2) Patient will sleep > 6 hours per night without disturbance by shoulder pain  Minimal progress   3) Patient will resume light housework  Progressing   4) Patient will consistently perform all self care activities without restriction Progressing   5) Decrease functional deficit to < 40% as indicated by FOTO shoulder survey Progressing/Modified 4/7/2022:  Decrease functional deficit to < 30% as indicated by FOTO shoulder survey Ongoing   6) Patient will be independent in HEP for ROM and light strengthening   Progressing      PLAN     Progressive stretching and strengthening    Laura Marcmu, PT

## 2022-05-13 ENCOUNTER — CLINICAL SUPPORT (OUTPATIENT)
Dept: REHABILITATION | Facility: HOSPITAL | Age: 52
End: 2022-05-13
Attending: ORTHOPAEDIC SURGERY
Payer: COMMERCIAL

## 2022-05-13 DIAGNOSIS — M75.101 ROTATOR CUFF SYNDROME OF RIGHT SHOULDER: ICD-10-CM

## 2022-05-13 DIAGNOSIS — M25.60 LIMITED JOINT RANGE OF MOTION (ROM): Primary | ICD-10-CM

## 2022-05-13 DIAGNOSIS — R29.898 SHOULDER WEAKNESS: ICD-10-CM

## 2022-05-13 PROCEDURE — 97140 MANUAL THERAPY 1/> REGIONS: CPT | Mod: PN

## 2022-05-13 PROCEDURE — 97110 THERAPEUTIC EXERCISES: CPT | Mod: PN

## 2022-05-13 NOTE — PROGRESS NOTES
"[facility]   Physical Therapy Treatment Note     Name: Keren THOMAS [facility] Number: [id number]    Therapy Diagnosis:   Encounter Diagnoses   Name Primary?    Limited joint range of motion (ROM) Yes    Shoulder weakness     Rotator cuff syndrome of right shoulder      Physician: Jose Angel Isaac,*    Visit Date: 5/13/2022    Physician Orders: PT Eval and Treat   Medical Diagnosis from Referral: Rotator cuff syndrome of right shoulder  Evaluation Date: 3/22/2022  Authorization Period Expiration: 12/31/2022  Plan of Care Expiration: 6/22/2022  Progress Note Due: 6/22/2022  Visit # / Visits authorized: 12/ 20 (13 total)  FOTO: Survey completed 4/26/2022  45% residual deficit   Next survey due week of 5/16/2022    Precautions: Standard     PTA Visit #: 0/5     Time In: 1402  Time Out: 1448  Total Billable Time: 43 minutes    SUBJECTIVE     Pt reports: "It doesn't hurt today"   She was compliant with home exercise program.  Response to previous treatment: Sore but okay  Functional change: Participating in light yard work.       Pain: 0/10  Location: right anterolateral shoulder      OBJECTIVE     Objective Measures updated at progress report unless specified.     Treatment     Keren received the treatments listed below:      therapeutic exercises to develop strength and ROM for 33 minutes including (new exercises indicated in bold print):   UBE L3 4' fwd/4' back   Pulley assisted ROM x 2' ea    Flexion    Abduction    IR behind back x 1'   Doorway stretch 3x30s    Wall climbs with 2# wt 2 x 10   Plyoball toss 2 kg ball x 10 (B UE)   Standing wand exercises 3# 2x10 ea    Flexion    Abduction    ER/IR    Extension    IR behind back     ER/IR @ 90* abduction    Theraband resisted shoulder exercises with green theraband 2x10    Extension    Rows     Shoulder adduction     Resisted ER (at 0* abd)   Stick'm up 3x15s   Shoulder abduction (to 90*) with 3# wt 2x10   Sustained shoulder " elevation with 3# wt while walking 2x50'   Supine wand exercises 4# bar 2x10 ea    Serratus punch   Supine butterfly (shoulder rotation with abd) 2x10   Prone shoulder exercises 2x10 ea    Extension    Rows     Flexion    manual therapy techniques: Joint mobilizations and PROM were applied to the: R shoulder for 10 minutes, including:   Grade II/III superior to inferior glide, anterior to posterior glide   PROM to R shoulder   Scapular scouring to R scapula in prone     Patient Education and Home Exercises     Home Exercises Provided and Patient Education Provided     Education provided:   -      Written Home Exercises Provided: Instructed patient to continue prior HEP. Exercises were reviewed and Keren was able to demonstrate them prior to the end of the session.  Keren demonstrated good  understanding of the education provided. See EMR under Patient Instructions for exercises provided during therapy sessions    ASSESSMENT     No pain prior to therapy.  Experienced increased fatigue during therapy but tolerated increased weight on several exercises without significant increase in pain.      Keren Is progressing well towards her goals.   Pt prognosis is Good.     Pt will continue to benefit from skilled outpatient physical therapy to address the deficits listed in the problem list box on initial evaluation, provide pt/family education and to maximize pt's level of independence in the home and community environment.     Pt's spiritual, cultural and educational needs considered and pt agreeable to plan of care and goals.     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   Progressing/nearly met                5) Patient will transition to sleeping in bed (vs recliner) at least 2-3 hours at a time Ongoing (has not attempted)     Long Term Goals: modified:                1) Achieve > 75% of normal AROM of R shoulder to allow reaching to 2nd shelf in cabinet Met 5/2/2022              2) Patient will sleep > 6 hours per night without disturbance by shoulder pain  Minimal progress              3) Patient will resume light housework  Met 5/10/2022 MODIFIED (5/10/2022): patient will resume regular household chores Progressing              4) Patient will consistently perform all self care activities without restriction Progressing/Nearly met              5) Decrease functional deficit to < 40% as indicated by FOTO shoulder survey Progressing/Modified 4/7/2022:  Decrease functional deficit to < 30% as indicated by FOTO shoulder survey Ongoing              6) Patient will be independent in HEP for ROM and light strengthening   Progressing              7) Patient will resume work activities.   Ongoing       PLAN     Increased resistance for exercise, work on overhead lifting, pushing/pulling.      Laura Marcum, PT

## 2022-05-17 ENCOUNTER — CLINICAL SUPPORT (OUTPATIENT)
Dept: REHABILITATION | Facility: HOSPITAL | Age: 52
End: 2022-05-17
Attending: ORTHOPAEDIC SURGERY
Payer: COMMERCIAL

## 2022-05-17 DIAGNOSIS — R29.898 SHOULDER WEAKNESS: ICD-10-CM

## 2022-05-17 DIAGNOSIS — M25.60 LIMITED JOINT RANGE OF MOTION (ROM): Primary | ICD-10-CM

## 2022-05-17 DIAGNOSIS — M75.101 ROTATOR CUFF SYNDROME OF RIGHT SHOULDER: ICD-10-CM

## 2022-05-17 PROCEDURE — 97140 MANUAL THERAPY 1/> REGIONS: CPT | Mod: PN

## 2022-05-17 PROCEDURE — 97110 THERAPEUTIC EXERCISES: CPT | Mod: PN

## 2022-05-17 NOTE — PROGRESS NOTES
"[facility]   Physical Therapy Treatment Note     Name: Keren THOMAS [facility] Number: [id number]    Therapy Diagnosis:   Encounter Diagnoses   Name Primary?    Limited joint range of motion (ROM) Yes    Shoulder weakness     Rotator cuff syndrome of right shoulder      Physician: Jose Angel Isaac,*    Visit Date: 5/17/2022    Physician Orders: PT Eval and Treat   Medical Diagnosis from Referral: Rotator cuff syndrome of right shoulder  Evaluation Date: 3/22/2022  Authorization Period Expiration: 12/31/2022  Plan of Care Expiration: 6/22/2022  Progress Note Due: 6/22/2022  Visit # / Visits authorized: 13/ 20 (14 total)  FOTO: Survey completed 5/17/2022  35% residual deficit   Next survey due week of 6/6/2022    Precautions: Standard     PTA Visit #: 0/5     Time In: 0850  Time Out: 0933  Total Billable Time: 40 minutes    SUBJECTIVE     Pt reports: "I don't like someone else to cut my grass.  I like to do it myself"  She was compliant with home exercise program.  Response to previous treatment: A little sore.    Functional change: Participating in light yard work.       Pain: 4/10  Location: right anterolateral shoulder      OBJECTIVE     Objective Measures updated at progress report unless specified.     Treatment     Keren received the treatments listed below:      therapeutic exercises to develop strength and ROM for 32 minutes including (new exercises indicated in bold print):   UBE L3 4' fwd/4' back   Pulley assisted ROM x 2' ea    Flexion    Abduction    IR behind back x 1'   Doorway stretch 3x30s    Plyoball toss 2 kg ball x 10 (B UE)   Standing wand exercises 3# 2x10 ea    Flexion    Abduction    ER/IR    Extension    IR behind back     ER/IR @ 90* abduction    Theraband resisted shoulder exercises with blue theraband 2x10    Extension    Rows     Shoulder adduction     Resisted ER (at 0* abd) green tband    Horizontal abduction green tband   Stick'm up 3x15s   Shoulder " abduction (to 120*) with 3# wt 2x10   Sustained shoulder elevation with 3# wt while walking 2x50'   Supine wand exercises 4# bar 2x10 ea    Serratus punch   Supine butterfly (shoulder rotation with abd) 2x10   Prone shoulder exercises 2x10 ea    Extension    Rows     Flexion    Discontinued   Wall climbs with 2# wt 2 x 10    manual therapy techniques: Joint mobilizations and PROM were applied to the: R shoulder for 8 minutes, including:   Grade II/III lateral distraction, posterior to anterior glide   Grade II rotation of R AC joint   PROM to R shoulder   Scapular scouring to R scapula in prone     Patient Education and Home Exercises     Home Exercises Provided and Patient Education Provided     Education provided:   -      Written Home Exercises Provided: Instructed patient to continue prior HEP. Exercises were reviewed and Keren was able to demonstrate them prior to the end of the session.  Keren demonstrated good  understanding of the education provided. See EMR under Patient Instructions for exercises provided during therapy sessions    ASSESSMENT     Exercise tolerance improving.  End range discomfort persists in flexion and ER.        Keren Is progressing well towards her goals.   Pt prognosis is Good.     Pt will continue to benefit from skilled outpatient physical therapy to address the deficits listed in the problem list box on initial evaluation, provide pt/family education and to maximize pt's level of independence in the home and community environment.     Pt's spiritual, cultural and educational needs considered and pt agreeable to plan of care and goals.     Anticipated barriers to physical therapy: None    Goals:   Short Term Goals    1) Decrease max pain to < 5/10 Nearly met              2) Facilitate improved upper quadrant flexibility Progressing              3) Decrease tenderness to minimal Nearly met              4) Patient will consistently reach to top of head using R UE to allow for ease in  hair dressing.  Met 5/17/2022               5) Patient will transition to sleeping in bed (vs recliner) at least 2-3 hours at a time Ongoing (has not attempted)     Long Term Goals: modified:                1) Achieve > 75% of normal AROM of R shoulder to allow reaching to 2nd shelf in cabinet Met 5/2/2022              2) Patient will sleep > 6 hours per night without disturbance by shoulder pain  Minimal progress              3) Patient will resume light housework  Met 5/10/2022 MODIFIED (5/10/2022): patient will resume regular household chores Progressing              4) Patient will consistently perform all self care activities without restriction Progressing/Nearly met              5) Decrease functional deficit to < 40% as indicated by FOTO shoulder survey Progressing/Modified 4/7/2022:  Decrease functional deficit to < 30% as indicated by FOTO shoulder survey Ongoing              6) Patient will be independent in HEP for ROM and light strengthening   Progressing              7) Patient will resume work activities.   Ongoing       PLAN     Add  press using wand/dumbbell, sustained overhead activity.        Laura Marcum, PT

## 2022-05-24 ENCOUNTER — CLINICAL SUPPORT (OUTPATIENT)
Dept: REHABILITATION | Facility: HOSPITAL | Age: 52
End: 2022-05-24
Attending: ORTHOPAEDIC SURGERY
Payer: COMMERCIAL

## 2022-05-24 DIAGNOSIS — R29.898 SHOULDER WEAKNESS: ICD-10-CM

## 2022-05-24 DIAGNOSIS — M75.101 ROTATOR CUFF SYNDROME OF RIGHT SHOULDER: ICD-10-CM

## 2022-05-24 DIAGNOSIS — M25.60 LIMITED JOINT RANGE OF MOTION (ROM): Primary | ICD-10-CM

## 2022-05-24 PROCEDURE — 97110 THERAPEUTIC EXERCISES: CPT | Mod: PN

## 2022-05-24 PROCEDURE — 97140 MANUAL THERAPY 1/> REGIONS: CPT | Mod: PN

## 2022-05-24 NOTE — PROGRESS NOTES
"[facility]   Physical Therapy Treatment Note     Name: Keren THOMAS [facility] Number: [id number]    Therapy Diagnosis:   Encounter Diagnoses   Name Primary?    Limited joint range of motion (ROM) Yes    Shoulder weakness     Rotator cuff syndrome of right shoulder      Physician: Jose Angel Isaac,*    Visit Date: 5/24/2022    Physician Orders: PT Eval and Treat   Medical Diagnosis from Referral: Rotator cuff syndrome of right shoulder  Evaluation Date: 3/22/2022  Authorization Period Expiration: 12/31/2022  Plan of Care Expiration: 6/22/2022  Progress Note Due: 6/22/2022  Visit # / Visits authorized: 14/ 20 (15 total)  FOTO: Survey completed 5/17/2022  35% residual deficit   Next survey due week of 6/6/2022    Precautions: Standard     PTA Visit #: 0/5     Time In: 1326  Time Out: 1408   Total Billable Time: 38 minutes    SUBJECTIVE     Pt reports: "This one still gets me" (pulley assisted IR)  She was compliant with home exercise program.  Response to previous treatment: Sore.    Functional change: Participating in light yard work.       Pain: 3/10  Location: right anterolateral shoulder      OBJECTIVE     Objective Measures updated at progress report unless specified.     Treatment     Keren received the treatments listed below:      therapeutic exercises to develop strength and ROM for 30 minutes including (new exercises indicated in bold print):   UBE L3 3' fwd/3' back   Pulley assisted ROM x 2' ea    Flexion    Abduction    IR behind back x 1'   Doorway stretch 3x30s    Plyoball toss 2 kg ball x 10 (B UE)   Standing wand exercises 3# 2x10 ea    Flexion    Abduction    ER/IR    Extension    IR behind back     ER/IR @ 90* abduction      press   Theraband resisted shoulder exercises with blue theraband 2x10    Extension    Rows     Shoulder adduction     Resisted ER (at 0* abd)     Horizontal abduction    Stick'm up 3x20s   Shoulder abduction (to 120*) with 3# wt " 2x10   Sustained shoulder elevation with 3# wt while walking 2x50'   Supine wand exercises 4# bar 2x10 ea    Serratus punch   Supine butterfly (shoulder rotation with abd) 2x10   Prone shoulder exercises 1# 2x10 ea    Extension    Rows     Flexion    Discontinued   Wall climbs with 2# wt 2 x 10    manual therapy techniques: Joint mobilizations and PROM were applied to the: R shoulder for 8 minutes, including:   Grade II/III lateral distraction, posterior to anterior glide   Grade II rotation of R AC joint   PROM to R shoulder   Scapular scouring to R scapula in prone     Patient Education and Home Exercises     Home Exercises Provided and Patient Education Provided     Education provided:   -  Instructed pt in  press.   Written Home Exercises Provided: Instructed patient to continue prior HEP. Exercises were reviewed and Keren was able to demonstrate them prior to the end of the session.  Keren demonstrated good  understanding of the education provided. See EMR under Patient Instructions for exercises provided during therapy sessions    ASSESSMENT     Minor crepitus noted with scapular scouring.  Strength improving.  Continues to experience end range discomfort with flexion, abduction and IR.        Keren Is progressing well towards her goals.   Pt prognosis is Good.     Pt will continue to benefit from skilled outpatient physical therapy to address the deficits listed in the problem list box on initial evaluation, provide pt/family education and to maximize pt's level of independence in the home and community environment.     Pt's spiritual, cultural and educational needs considered and pt agreeable to plan of care and goals.     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  Met 5/17/2022               5) Patient will transition to sleeping in bed (vs recliner) at least 2-3 hours at a time Ongoing (has not attempted)     Long Term Goals: modified:                1) Achieve > 75% of normal AROM of R shoulder to allow reaching to 2nd shelf in cabinet Met 5/2/2022              2) Patient will sleep > 6 hours per night without disturbance by shoulder pain  Minimal progress              3) Patient will resume light housework  Met 5/10/2022 MODIFIED (5/10/2022): patient will resume regular household chores Progressing              4) Patient will consistently perform all self care activities without restriction Progressing/Nearly met              5) Decrease functional deficit to < 40% as indicated by FOTO shoulder survey Progressing/Modified 4/7/2022:  Decrease functional deficit to < 30% as indicated by FOTO shoulder survey Progressing              6) Patient will be independent in HEP for ROM and light strengthening   Progressing              7) Patient will resume work activities.   Ongoing       PLAN     Progressive stretching/strengthening, functional reaching/lifting.        Laura Marcum, PT

## 2022-05-26 ENCOUNTER — CLINICAL SUPPORT (OUTPATIENT)
Dept: REHABILITATION | Facility: HOSPITAL | Age: 52
End: 2022-05-26
Attending: ORTHOPAEDIC SURGERY
Payer: COMMERCIAL

## 2022-05-26 DIAGNOSIS — M25.60 LIMITED JOINT RANGE OF MOTION (ROM): Primary | ICD-10-CM

## 2022-05-26 DIAGNOSIS — M75.101 ROTATOR CUFF SYNDROME OF RIGHT SHOULDER: ICD-10-CM

## 2022-05-26 DIAGNOSIS — R29.898 SHOULDER WEAKNESS: ICD-10-CM

## 2022-05-26 PROCEDURE — 97140 MANUAL THERAPY 1/> REGIONS: CPT | Mod: PN

## 2022-05-26 PROCEDURE — 97110 THERAPEUTIC EXERCISES: CPT | Mod: PN

## 2022-05-26 NOTE — PROGRESS NOTES
"[facility]   Physical Therapy Treatment Note     Name: Keren THOMAS [facility] Number: [id number]    Therapy Diagnosis:   Encounter Diagnoses   Name Primary?    Limited joint range of motion (ROM) Yes    Shoulder weakness     Rotator cuff syndrome of right shoulder      Physician: Jose Angel Isaac,*    Visit Date: 5/26/2022    Physician Orders: PT Eval and Treat   Medical Diagnosis from Referral: Rotator cuff syndrome of right shoulder  Evaluation Date: 3/22/2022  Authorization Period Expiration: 12/31/2022  Plan of Care Expiration: 6/22/2022  Progress Note Due: 6/22/2022  Visit # / Visits authorized: 15/ 20 (16 total)  FOTO: Survey completed 5/17/2022  35% residual deficit   Next survey due week of 6/6/2022    Precautions: Standard     PTA Visit #: 0/5     Time In: 1322  Time Out: 1412  Total Billable Time: 38 minutes    SUBJECTIVE     Pt reports: "It's better."  She was compliant with home exercise program.  Response to previous treatment: A little sore   Functional change: Participating in light yard work.       Pain: 2/10  Location: right anterolateral shoulder      OBJECTIVE     Objective Measures updated at progress report unless specified.     Treatment     Keren received the treatments listed below:      therapeutic exercises to develop strength and ROM for 36 minutes including (new exercises indicated in bold print):   UBE L4 4' fwd/4' back   Pulley assisted ROM x 2' ea    Flexion    Abduction    IR behind back x 1'   Doorway stretch 3x30s    Plyoball toss 2 kg ball x 10 (B UE)   Standing wand exercises 4# 2x10 ea    Flexion    Abduction    ER/IR    Extension    IR behind back     ER/IR @ 90* abduction      press   Theraband resisted shoulder exercises with blue theraband 2x10    Extension    Rows     Shoulder adduction     Resisted ER (at 0* abd)     Horizontal abduction    Stick'm up 3x20s   Shoulder abduction (to 120*) with 3# wt 2x10   Sustained shoulder " elevation with 3# wt while walking 2x50'   Supine wand exercises 4# bar 2x10 ea    Serratus punch   Supine butterfly (shoulder rotation with abd) 2x10   Prone shoulder exercises 2# 2x10 ea    Extension    Rows     Flexion    manual therapy techniques: Joint mobilizations and PROM were applied to the: R shoulder for 9 minutes, including:   Grade II/III posterior to anterior glide   PROM to R shoulder   Scapular scouring to R scapula in prone     Patient Education and Home Exercises     Home Exercises Provided and Patient Education Provided     Education provided:   -    Written Home Exercises Provided: Instructed patient to continue prior HEP. Exercises were reviewed and Keren was able to demonstrate them prior to the end of the session.  Keren demonstrated good  understanding of the education provided. See EMR under Patient Instructions for exercises provided during therapy sessions    ASSESSMENT     Moderate crepitus noted with scapular scouring but decreased following activity.  Tolerated increased resistance for wand exercises and prone exercises but with increased fatigue.    Keren Is progressing well towards her goals.   Pt prognosis is Good.     Pt will continue to benefit from skilled outpatient physical therapy to address the deficits listed in the problem list box on initial evaluation, provide pt/family education and to maximize pt's level of independence in the home and community environment.     Pt's spiritual, cultural and educational needs considered and pt agreeable to plan of care and goals.     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  Met 5/17/2022               5) Patient will  transition to sleeping in bed (vs recliner) at least 2-3 hours at a time Progressing     Long Term Goals: modified:                1) Achieve > 75% of normal AROM of R shoulder to allow reaching to 2nd shelf in cabinet Met 5/2/2022              2) Patient will sleep > 6 hours per night without disturbance by shoulder pain  Progressing              3) Patient will resume light housework  Met 5/10/2022 MODIFIED (5/10/2022): patient will resume regular household chores Progressing              4) Patient will consistently perform all self care activities without restriction Met 5/26/2022              5) Decrease functional deficit to < 40% as indicated by FOTO shoulder survey Progressing/Modified 4/7/2022:  Decrease functional deficit to < 30% as indicated by FOTO shoulder survey Progressing              6) Patient will be independent in HEP for ROM and light strengthening   Progressing              7) Patient will resume work activities.   Ongoing       PLAN     Joint mobilization and stretching to achieve end range flexion/abducation and internal rotation.  Progressive strengthening.          Laura Marcum, PT

## 2022-06-01 ENCOUNTER — HOSPITAL ENCOUNTER (OUTPATIENT)
Dept: RADIOLOGY | Facility: CLINIC | Age: 52
Discharge: HOME | End: 2022-06-01
Attending: OBSTETRICS & GYNECOLOGY
Payer: COMMERCIAL

## 2022-06-01 ENCOUNTER — CLINICAL SUPPORT (OUTPATIENT)
Dept: REHABILITATION | Facility: HOSPITAL | Age: 52
End: 2022-06-01
Attending: ORTHOPAEDIC SURGERY
Payer: COMMERCIAL

## 2022-06-01 DIAGNOSIS — Z12.31 ENCOUNTER FOR SCREENING MAMMOGRAM FOR BREAST CANCER: ICD-10-CM

## 2022-06-01 PROCEDURE — 77063 BREAST TOMOSYNTHESIS BI: CPT | Mod: TC,PO

## 2022-06-01 PROCEDURE — 77067 MAMMO DIGITAL SCREENING BILAT WITH TOMO: ICD-10-PCS | Mod: 26,,, | Performed by: RADIOLOGY

## 2022-06-01 PROCEDURE — 77063 BREAST TOMOSYNTHESIS BI: CPT | Mod: 26,,, | Performed by: RADIOLOGY

## 2022-06-01 PROCEDURE — 97140 MANUAL THERAPY 1/> REGIONS: CPT | Mod: PN

## 2022-06-01 PROCEDURE — 77067 SCR MAMMO BI INCL CAD: CPT | Mod: 26,,, | Performed by: RADIOLOGY

## 2022-06-01 PROCEDURE — 97110 THERAPEUTIC EXERCISES: CPT | Mod: PN

## 2022-06-01 PROCEDURE — 77063 MAMMO DIGITAL SCREENING BILAT WITH TOMO: ICD-10-PCS | Mod: 26,,, | Performed by: RADIOLOGY

## 2022-06-01 PROCEDURE — 77067 SCR MAMMO BI INCL CAD: CPT | Mod: TC,PO

## 2022-06-01 NOTE — PROGRESS NOTES
"[facility]   Physical Therapy Treatment Note     Name: Keren THOMAS [facility]  Clinic Number: [id number]    Therapy Diagnosis:   No diagnosis found.  Physician: Jose Angel Isaac,*    Visit Date: 6/1/2022    Physician Orders: PT Eval and Treat   Medical Diagnosis from Referral: Rotator cuff syndrome of right shoulder  Evaluation Date: 3/22/2022  Authorization Period Expiration: 12/31/2022  Plan of Care Expiration: 6/22/2022  Progress Note Due: 6/22/2022  Visit # / Visits authorized: 15/ 20 (16 total)  FOTO: Survey completed 5/17/2022  35% residual deficit   Next survey due week of 6/6/2022    Precautions: Standard     PTA Visit #: 0/5     Time In: 0945  Time Out: 1030  Total Billable Time: 38 minutes    SUBJECTIVE     Pt reports: " states she tweaked it last night closing her pantry door    She was compliant with home exercise program.  Response to previous treatment: A little sore   Functional change: Participating in light yard work.       Pain: 410  Location: right anterolateral shoulder      OBJECTIVE     Objective Measures updated at progress report unless specified.     Treatment     Keren received the treatments listed below:      therapeutic exercises to develop strength and ROM for 30 minutes including (new exercises indicated in bold print):   UBE L4 4' fwd/4' back   Pulley assisted ROM x 2' ea    Flexion    Abduction    IR behind back x 1'   Doorway stretch 3x30s    Plyoball toss 2 kg ball x 10 (B UE)   Standing wand exercises 4# 2x10 ea    Flexion    Abduction    ER/IR    Extension    IR behind back     ER/IR @ 90* abduction      press   Theraband resisted shoulder exercises with blue theraband 2x10    Extension    Rows     Shoulder adduction     Resisted ER (at 0* abd)     Horizontal abduction    Stick'm up 3x20s   Shoulder abduction (to 120*) with 3# wt 2x10   Sustained shoulder elevation with 3# wt while walking 2x50'   Supine wand exercises 4# bar 2x10 ea    Serratus " punch   Supine butterfly (shoulder rotation with abd) 2x10   Prone shoulder exercises 2# 2x10 ea    Extension    Rows     Flexion  manual therapy techniques: Joint mobilizations and PROM were applied to the: R shoulder for 9 minutes, including:   Grade II/III posterior to anterior glide   PROM to R shoulder   Scapular scouring to R scapula in prone     Patient Education and Home Exercises     Home Exercises Provided and Patient Education Provided     Education provided:   -    Written Home Exercises Provided: Instructed patient to continue prior HEP. Exercises were reviewed and Keren was able to demonstrate them prior to the end of the session.  Keren demonstrated good  understanding of the education provided. See EMR under Patient Instructions for exercises provided during therapy sessions    ASSESSMENT   Having more pain today due to episode last night  Continues to have good rom .   .  Tolerated increased resistance for wand exercises and prone exercises but with increased fatigue.    Keren Is progressing well towards her goals.   Pt prognosis is Good.     Pt will continue to benefit from skilled outpatient physical therapy to address the deficits listed in the problem list box on initial evaluation, provide pt/family education and to maximize pt's level of independence in the home and community environment.     Pt's spiritual, cultural and educational needs considered and pt agreeable to plan of care and goals.     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  Met 5/17/2022               5) Patient will transition to sleeping in bed (vs recliner) at least 2-3 hours at a time Progressing     Long  Term Goals: modified:                1) Achieve > 75% of normal AROM of R shoulder to allow reaching to 2nd shelf in cabinet Met 5/2/2022              2) Patient will sleep > 6 hours per night without disturbance by shoulder pain  Progressing              3) Patient will resume light housework  Met 5/10/2022 MODIFIED (5/10/2022): patient will resume regular household chores Progressing              4) Patient will consistently perform all self care activities without restriction Met 5/26/2022              5) Decrease functional deficit to < 40% as indicated by FOTO shoulder survey Progressing/Modified 4/7/2022:  Decrease functional deficit to < 30% as indicated by FOTO shoulder survey Progressing              6) Patient will be independent in [facility] for ROM and light strengthening   Progressing              7) Patient will resume work activities.   Ongoing       PLAN     Joint mobilization and stretching to achieve end range flexion/abducation and internal rotation.  Progressive strengthening.          Nichelle Polk, PT

## 2022-06-09 ENCOUNTER — CLINICAL SUPPORT (OUTPATIENT)
Dept: REHABILITATION | Facility: HOSPITAL | Age: 52
End: 2022-06-09
Attending: ORTHOPAEDIC SURGERY
Payer: COMMERCIAL

## 2022-06-09 DIAGNOSIS — R29.898 SHOULDER WEAKNESS: ICD-10-CM

## 2022-06-09 DIAGNOSIS — M75.101 ROTATOR CUFF SYNDROME OF RIGHT SHOULDER: ICD-10-CM

## 2022-06-09 DIAGNOSIS — M25.60 LIMITED JOINT RANGE OF MOTION (ROM): Primary | ICD-10-CM

## 2022-06-09 PROCEDURE — 97140 MANUAL THERAPY 1/> REGIONS: CPT | Mod: PN

## 2022-06-09 PROCEDURE — 97110 THERAPEUTIC EXERCISES: CPT | Mod: PN

## 2022-06-09 NOTE — PROGRESS NOTES
"[facility]   Physical Therapy Treatment Note     Name: Keren THOMAS [facility] Number: [id number]    Therapy Diagnosis:   Encounter Diagnoses   Name Primary?    Limited joint range of motion (ROM) Yes    Shoulder weakness     Rotator cuff syndrome of right shoulder      Physician: Jose Angel Isaac,*    Visit Date: 6/9/2022    Physician Orders: PT Eval and Treat   Medical Diagnosis from Referral: Rotator cuff syndrome of right shoulder  Evaluation Date: 3/22/2022  Authorization Period Expiration: 12/31/2022  Plan of Care Expiration: 6/22/2022  Progress Note Due: 6/22/2022  Visit # / Visits authorized: 17/ 20 (18 total)  FOTO: Survey completed 6/9/2022  35% residual deficit     Precautions: Standard     PTA Visit #: 0/5     Time In: 1232  Time Out: 1320  Total Billable Time: 40 minutes    SUBJECTIVE     Pt reports: "It's been a rough week"  She was somewhat compliant with home exercise program.  Response to previous treatment: hurting that night (was hurting prior to session)  Functional change: Reached behind and had increased pain.       Pain: 3/10  Location: right anterolateral shoulder      OBJECTIVE     Objective Measures updated at progress report unless specified.     Treatment     Keren received the treatments listed below:      therapeutic exercises to develop strength and ROM for 36 minutes including (new exercises indicated in bold print):   UBE L4 4' fwd/4' back   Pulley assisted ROM x 2' ea    Flexion    Abduction    IR behind back x 1'   Doorway stretch 3x30s    Plyoball toss 2 kg ball x 10 (B UE)   Standing wand exercises 4# 2x10 ea    Flexion    Abduction    ER/IR    Extension    IR behind back     ER/IR @ 90* abduction      press   Theraband resisted shoulder exercises with blue theraband 2x10    Extension    Rows     Shoulder adduction     Resisted ER (at 0* abd)     Horizontal abduction    Stick'm up 3x20s   Shoulder abduction (to 90*) with 4# wt " 2x10   Sustained shoulder elevation with 4# wt while walking 2x50'   Supine wand exercises 4# bar 2x10 ea    Serratus punch   Supine butterfly (shoulder rotation with abd) 2x10   Prone shoulder exercises 2# 2x10 ea    Extension    Rows     Flexion    manual therapy techniques: Joint mobilizations and PROM were applied to the: R shoulder for 8 minutes, including:   Grade II/III posterior to anterior glide R GH joint (prone), posterior to anterior glide with internal rotation bias R GH joint (prone)   Grade II/III superior to inferior glide R GH joint in sitting with shoulder abducted to 90*   Grade II/III anterior to posterior glide with ER bias (supine)   PROM to R shoulder     Patient Education and Home Exercises     Home Exercises Provided and Patient Education Provided     Education provided:   -    Written Home Exercises Provided: Instructed patient to continue prior HEP. Exercises were reviewed and Keren was able to demonstrate them prior to the end of the session.  Keren demonstrated good  understanding of the education provided. See EMR under Patient Instructions for exercises provided during therapy sessions    ASSESSMENT     Nearing full R shoulder ER @ 90* abduction, IR remains significantly impaired and painful. Tolerated posterior to anterior glide with IR bias of R GH joint with moderate discomfort.    Keren Is progressing well towards her goals.   Pt prognosis is Good.     Pt will continue to benefit from skilled outpatient physical therapy to address the deficits listed in the problem list box on initial evaluation, provide pt/family education and to maximize pt's level of independence in the home and community environment.     Pt's spiritual, cultural and educational needs considered and pt agreeable to plan of care and goals.     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  Met 5/17/2022               5) Patient will transition to sleeping in bed (vs recliner) at least 2-3 hours at a time Progressing     Long Term Goals: modified:                1) Achieve > 75% of normal AROM of R shoulder to allow reaching to 2nd shelf in cabinet Met 5/2/2022              2) Patient will sleep > 6 hours per night without disturbance by shoulder pain  Progressing              3) Patient will resume light housework  Met 5/10/2022 MODIFIED (5/10/2022): patient will resume regular household chores Progressing              4) Patient will consistently perform all self care activities without restriction Met 5/26/2022              5) Decrease functional deficit to < 40% as indicated by FOTO shoulder survey Progressing/Modified 4/7/2022:  Decrease functional deficit to < 30% as indicated by FOTO shoulder survey Progressing              6) Patient will be independent in HEP for ROM and light strengthening   Progressing              7) Patient will resume work activities.   Ongoing       PLAN     Joint mobilization/stretching with increased emphasis on improving R shoulder IR.  Continue progressive stretching.            Laura Marcum, PT

## 2022-06-13 ENCOUNTER — OFFICE VISIT (OUTPATIENT)
Dept: ORTHOPEDICS | Facility: CLINIC | Age: 52
End: 2022-06-13
Payer: COMMERCIAL

## 2022-06-13 VITALS — WEIGHT: 140 LBS | HEIGHT: 66 IN | BODY MASS INDEX: 22.5 KG/M2 | RESPIRATION RATE: 18 BRPM

## 2022-06-13 DIAGNOSIS — M75.101 ROTATOR CUFF SYNDROME OF RIGHT SHOULDER: Primary | ICD-10-CM

## 2022-06-13 PROCEDURE — 1160F PR REVIEW ALL MEDS BY PRESCRIBER/CLIN PHARMACIST DOCUMENTED: ICD-10-PCS | Mod: CPTII,S$GLB,, | Performed by: ORTHOPAEDIC SURGERY

## 2022-06-13 PROCEDURE — 1159F PR MEDICATION LIST DOCUMENTED IN MEDICAL RECORD: ICD-10-PCS | Mod: CPTII,S$GLB,, | Performed by: ORTHOPAEDIC SURGERY

## 2022-06-13 PROCEDURE — 1159F MED LIST DOCD IN RCRD: CPT | Mod: CPTII,S$GLB,, | Performed by: ORTHOPAEDIC SURGERY

## 2022-06-13 PROCEDURE — 99999 PR PBB SHADOW E&M-EST. PATIENT-LVL III: ICD-10-PCS | Mod: PBBFAC,,, | Performed by: ORTHOPAEDIC SURGERY

## 2022-06-13 PROCEDURE — 1160F RVW MEDS BY RX/DR IN RCRD: CPT | Mod: CPTII,S$GLB,, | Performed by: ORTHOPAEDIC SURGERY

## 2022-06-13 PROCEDURE — 99213 OFFICE O/P EST LOW 20 MIN: CPT | Mod: S$GLB,,, | Performed by: ORTHOPAEDIC SURGERY

## 2022-06-13 PROCEDURE — 99999 PR PBB SHADOW E&M-EST. PATIENT-LVL III: CPT | Mod: PBBFAC,,, | Performed by: ORTHOPAEDIC SURGERY

## 2022-06-13 PROCEDURE — 99213 PR OFFICE/OUTPT VISIT, EST, LEVL III, 20-29 MIN: ICD-10-PCS | Mod: S$GLB,,, | Performed by: ORTHOPAEDIC SURGERY

## 2022-06-13 PROCEDURE — 3008F BODY MASS INDEX DOCD: CPT | Mod: CPTII,S$GLB,, | Performed by: ORTHOPAEDIC SURGERY

## 2022-06-13 PROCEDURE — 3008F PR BODY MASS INDEX (BMI) DOCUMENTED: ICD-10-PCS | Mod: CPTII,S$GLB,, | Performed by: ORTHOPAEDIC SURGERY

## 2022-06-13 NOTE — PROGRESS NOTES
Past Medical History:   Diagnosis Date    ADHD     Thyroid disease        Past Surgical History:   Procedure Laterality Date    APPENDECTOMY      ARTHROSCOPIC ACROMIOPLASTY OF SHOULDER  2022    Procedure: ACROMIOPLASTY, ARTHROSCOPIC;  Surgeon: Jose Angel Isaac MD;  Location: [facility] OR;  Service: Orthopedics;;    ARTHROSCOPIC DEBRIDEMENT OF SHOULDER  2022    Procedure: DEBRIDEMENT, SHOULDER, ARTHROSCOPIC;  Surgeon: Jose Angel Isaac MD;  Location: [facility] OR;  Service: Orthopedics;;    ARTHROSCOPY OF SHOULDER WITH DECOMPRESSION OF SUBACROMIAL SPACE  2022    Procedure: ARTHROSCOPY, SHOULDER, WITH SUBACROMIAL SPACE DECOMPRESSION;  Surgeon: Jose Angel Isaac MD;  Location: [facility] OR;  Service: Orthopedics;;    AUGMENTATION OF BREAST      BREAST SURGERY      CARPAL TUNNEL RELEASE      OOPHORECTOMY         Current Outpatient Medications   Medication Sig    acetaminophen (TYLENOL) 500 MG tablet Take 2 tablets (1,000 mg total) by mouth every 8 (eight) hours as needed for Pain.    dextroamphetamine-amphetamine (ADDERALL) 12.5 MG tablet Take 30 mg by mouth once daily.    docusate sodium (COLACE) 250 MG capsule Take 250 mg by mouth once daily.    ibuprofen (ADVIL,MOTRIN) 800 MG tablet Take 1 tablet (800 mg total) by mouth every 6 (six) hours as needed for Pain.    levothyroxine (SYNTHROID) 25 MCG tablet Take 25 mcg by mouth before breakfast.    multivitamin (THERAGRAN) per tablet Take 1 tablet by mouth once daily.     No current facility-administered medications for this visit.       Review of patient's allergies indicates:  No Known Allergies    History reviewed. No pertinent family history.    Social History     Socioeconomic History    Marital status:    Tobacco Use    Smoking status: Former Smoker     Quit date: 2020     Years since quittin.2    Smokeless tobacco: Never Used   Substance and Sexual Activity    Alcohol use: Not Currently       Chief Complaint:    Chief Complaint   Patient presents with    Right Shoulder - Post-op Evaluation       Date of surgery:  March 11, 2022    History of present illness:  51-year-old female underwent right shoulder arthroscopy.  Patient's rotator cuff ended up looking okay and underwent acromioplasty and extensive debridement.  Had a lot of bursitis as well.  She has been doing physical therapy and doing pretty well.  Pain is a 4/10.  Still has some mild rritability and the scapular hiking has improved.      Review of Systems:    Musculoskeletal:  See HPI        Physical Examination:    Vital Signs:    Vitals:    06/13/22 1516   Resp: 18       Body mass index is 22.6 kg/m².    This a well-developed, well nourished patient in no acute distress.  They are alert and oriented and cooperative to examination.  Pt. walks without an antalgic gait.      Examination of the right shoulder shows healed surgical incisions.  No erythema drainage. Range of motion is normal t with the the ability to touch the top of her head.  Strength is about a 4+.    X-rays:  None     Assessment::  Status post right shoulder arthroscopy with acromioplasty and extensive debridement    Plan:  Reviewed the findings with her today.  Continue with the physical therapy to help with soreness and inflammation.  Follow-up in 8 weeks.  Will release her back to work in 3 weeks    This note was created using M Modal voice recognition software that occasionally misinterpreted phrases or words.

## 2022-06-30 ENCOUNTER — TELEPHONE (OUTPATIENT)
Dept: ORTHOPEDICS | Facility: CLINIC | Age: 52
End: 2022-06-30
Payer: COMMERCIAL

## 2022-06-30 NOTE — LETTER
June 30, 2022      [facility]  [address] [place] [address] [address]  [facility] [address]  Phone: [phone]       Patient: Keren Tong   YOB: 1970  Date of Visit: 06/30/2022    To Whom It May Concern:    Kate Tong  Was under my care at [facility]. The patient may return to work on 07/11/2022 with no restrictions. If you have any questions or concerns, or if I can be of further assistance, please do not hesitate to contact me.    Sincerely,    MD Tala Carrasco LPN

## 2022-06-30 NOTE — TELEPHONE ENCOUNTER
----- Message from Cammy Villalbaman sent at 6/30/2022 10:44 AM CDT -----  Type:  Patient Call Back    Who Called: Pt     What is the reqeust in detail: pt is requesting a call back in regards to her return to work day, she would like to change it to 07/11, she states that she was suppose to return on the 5th . Please Advise     Can the clinic reply by [facility]?    Best Call Back Number: [phone]

## 2022-08-16 ENCOUNTER — PATIENT MESSAGE (OUTPATIENT)
Dept: ORTHOPEDICS | Facility: CLINIC | Age: 52
End: 2022-08-16
Payer: COMMERCIAL

## 2022-11-28 ENCOUNTER — DOCUMENTATION ONLY (OUTPATIENT)
Dept: REHABILITATION | Facility: HOSPITAL | Age: 52
End: 2022-11-28
Payer: COMMERCIAL

## 2022-11-28 DIAGNOSIS — R29.898 SHOULDER WEAKNESS: Primary | ICD-10-CM

## 2022-11-28 DIAGNOSIS — M75.101 ROTATOR CUFF SYNDROME OF RIGHT SHOULDER: ICD-10-CM

## 2022-11-28 DIAGNOSIS — M25.60 LIMITED JOINT RANGE OF MOTION (ROM): ICD-10-CM

## 2022-11-28 NOTE — PROGRESS NOTES
[facility]  PT Discharge Note    Name: Keren THOMAS [facility] Number: [id number]    Therapy Diagnosis:   Encounter Diagnoses   Name Primary?    Shoulder weakness Yes    Limited joint range of motion (ROM)     Rotator cuff syndrome of right shoulder      Physician: Jose Angel Isaac,*     Physician Orders: PT Eval and Treat   Medical Diagnosis from Referral: Rotator cuff syndrome of right shoulder  Evaluation Date: 3/22/2022      Date of Last visit: 6/9/2022  Total Visits Received: 18    ASSESSMENT      Patient made steady progress in PT (see daily treatment notes and updated POC for details) but discontinued attending PT prior to achieving goals established in evaluation and updated in POC update.      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  Met 5/17/2022               5) Patient will transition to sleeping in bed (vs recliner) at least 2-3 hours at a time Progressing     Long Term Goals: modified:                1) Achieve > 75% of normal AROM of R shoulder to allow reaching to 2nd shelf in cabinet Met 5/2/2022              2) Patient will sleep > 6 hours per night without disturbance by shoulder pain  Progressing              3) Patient will resume light housework  Met 5/10/2022 MODIFIED (5/10/2022): patient will resume regular household chores Progressing              4) Patient will consistently perform all self care activities without restriction Met 5/26/2022              5) Decrease functional deficit to < 40% as indicated by FOTO shoulder survey  Progressing/Modified 4/7/2022:  Decrease functional deficit to < 30% as indicated by FOTO shoulder survey Progressing              6) Patient will be independent in HEP for ROM and light strengthening   Progressing              7) Patient will resume work activities.   Ongoing       PLAN   This patient is discharged from Physical Therapy      Laura Marcum, PT

## 2023-06-12 DIAGNOSIS — N63.0 MASS OF BREAST, UNSPECIFIED LATERALITY: Primary | ICD-10-CM

## 2023-06-30 ENCOUNTER — HOSPITAL ENCOUNTER (OUTPATIENT)
Dept: RADIOLOGY | Facility: HOSPITAL | Age: 53
Discharge: HOME | End: 2023-06-30
Attending: SURGERY
Payer: COMMERCIAL

## 2023-06-30 DIAGNOSIS — N63.0 MASS OF BREAST, UNSPECIFIED LATERALITY: ICD-10-CM

## 2023-06-30 PROCEDURE — 76642 ULTRASOUND BREAST LIMITED: CPT | Mod: 26,RT,, | Performed by: RADIOLOGY

## 2023-06-30 PROCEDURE — 77066 MAMMO DIGITAL DIAGNOSTIC BILAT WITH TOMO: ICD-10-PCS | Mod: 26,,, | Performed by: RADIOLOGY

## 2023-06-30 PROCEDURE — 76642 US BREAST RIGHT LIMITED: ICD-10-PCS | Mod: 26,RT,, | Performed by: RADIOLOGY

## 2023-06-30 PROCEDURE — 76642 ULTRASOUND BREAST LIMITED: CPT | Mod: TC,RT

## 2023-06-30 PROCEDURE — 77066 DX MAMMO INCL CAD BI: CPT | Mod: 26,,, | Performed by: RADIOLOGY

## 2023-06-30 PROCEDURE — 77066 DX MAMMO INCL CAD BI: CPT | Mod: TC

## 2023-06-30 PROCEDURE — 77062 BREAST TOMOSYNTHESIS BI: CPT | Mod: 26,,, | Performed by: RADIOLOGY

## 2023-06-30 PROCEDURE — 77062 MAMMO DIGITAL DIAGNOSTIC BILAT WITH TOMO: ICD-10-PCS | Mod: 26,,, | Performed by: RADIOLOGY

## 2023-07-06 DIAGNOSIS — R92.8 ABNORMAL MAMMOGRAM: Primary | ICD-10-CM

## 2023-07-07 ENCOUNTER — HOSPITAL ENCOUNTER (OUTPATIENT)
Dept: RADIOLOGY | Facility: HOSPITAL | Age: 53
Discharge: HOME | End: 2023-07-07
Attending: SURGERY
Payer: COMMERCIAL

## 2023-07-07 DIAGNOSIS — R92.8 ABNORMAL MAMMOGRAM OF RIGHT BREAST: ICD-10-CM

## 2023-07-07 DIAGNOSIS — R92.8 ABNORMAL MAMMOGRAM: ICD-10-CM

## 2023-07-07 PROCEDURE — 77065 DX MAMMO INCL CAD UNI: CPT | Mod: 26,RT,, | Performed by: RADIOLOGY

## 2023-07-07 PROCEDURE — 88305 TISSUE EXAM BY PATHOLOGIST: CPT | Performed by: PATHOLOGY

## 2023-07-07 PROCEDURE — 77061 BREAST TOMOSYNTHESIS UNI: CPT | Mod: TC,RT

## 2023-07-07 PROCEDURE — 77065 MAMMO DIGITAL DIAGNOSTIC RIGHT WITH TOMO: ICD-10-PCS | Mod: 26,RT,, | Performed by: RADIOLOGY

## 2023-07-07 PROCEDURE — 77061 MAMMO DIGITAL DIAGNOSTIC RIGHT WITH TOMO: ICD-10-PCS | Mod: 26,RT,, | Performed by: RADIOLOGY

## 2023-07-07 PROCEDURE — 88341 IMHCHEM/IMCYTCHM EA ADD ANTB: CPT | Performed by: PATHOLOGY

## 2023-07-07 PROCEDURE — 19083 BX BREAST 1ST LESION US IMAG: CPT | Mod: RT

## 2023-07-07 PROCEDURE — 19083 US BREAST BIOPSY WITH IMAGING 1ST SITE RIGHT: ICD-10-PCS | Mod: RT,,, | Performed by: RADIOLOGY

## 2023-07-07 PROCEDURE — 77061 BREAST TOMOSYNTHESIS UNI: CPT | Mod: 26,RT,, | Performed by: RADIOLOGY

## 2023-07-07 PROCEDURE — 19083 BX BREAST 1ST LESION US IMAG: CPT | Mod: RT,,, | Performed by: RADIOLOGY

## 2023-07-07 PROCEDURE — 88341 IMHCHEM/IMCYTCHM EA ADD ANTB: CPT | Mod: 26,,, | Performed by: PATHOLOGY

## 2023-07-07 PROCEDURE — 88341 PR IHC OR ICC EACH ADD'L SINGLE ANTIBODY  STAINPR: ICD-10-PCS | Mod: 26,,, | Performed by: PATHOLOGY

## 2023-07-07 PROCEDURE — 88342 CHG IMMUNOCYTOCHEMISTRY: ICD-10-PCS | Mod: 26,,, | Performed by: PATHOLOGY

## 2023-07-07 PROCEDURE — 88342 IMHCHEM/IMCYTCHM 1ST ANTB: CPT | Performed by: PATHOLOGY

## 2023-07-07 PROCEDURE — 88305 TISSUE EXAM BY PATHOLOGIST: ICD-10-PCS | Mod: 26,,, | Performed by: PATHOLOGY

## 2023-07-07 PROCEDURE — 88305 TISSUE EXAM BY PATHOLOGIST: CPT | Mod: 26,,, | Performed by: PATHOLOGY

## 2023-07-07 PROCEDURE — 88342 IMHCHEM/IMCYTCHM 1ST ANTB: CPT | Mod: 26,,, | Performed by: PATHOLOGY

## 2023-07-13 LAB
FINAL PATHOLOGIC DIAGNOSIS: NORMAL
GROSS: NORMAL
Lab: NORMAL

## 2023-07-20 ENCOUNTER — PATIENT MESSAGE (OUTPATIENT)
Dept: GASTROENTEROLOGY | Facility: CLINIC | Age: 53
End: 2023-07-20
Payer: COMMERCIAL

## 2023-07-26 ENCOUNTER — PATIENT MESSAGE (OUTPATIENT)
Dept: GASTROENTEROLOGY | Facility: CLINIC | Age: 53
End: 2023-07-26
Payer: COMMERCIAL

## 2023-07-26 DIAGNOSIS — Z12.11 SCREENING FOR COLON CANCER: Primary | ICD-10-CM

## 2023-07-28 ENCOUNTER — LAB VISIT (OUTPATIENT)
Dept: LAB | Facility: HOSPITAL | Age: 53
End: 2023-07-28
Attending: NURSE PRACTITIONER
Payer: COMMERCIAL

## 2023-07-28 DIAGNOSIS — N95.1 MENOPAUSAL SYMPTOMS: ICD-10-CM

## 2023-07-28 DIAGNOSIS — Z01.419 WOMEN'S ANNUAL ROUTINE GYNECOLOGICAL EXAMINATION: ICD-10-CM

## 2023-07-28 LAB
CHOLEST SERPL-MCNC: 229 MG/DL (ref 120–199)
CHOLEST/HDLC SERPL: 2.5 {RATIO} (ref 2–5)
FSH SERPL-ACNC: 119.13 MIU/ML
GLUCOSE SERPL-MCNC: 95 MG/DL (ref 70–110)
HDLC SERPL-MCNC: 93 MG/DL (ref 40–75)
HDLC SERPL: 40.6 % (ref 20–50)
LDLC SERPL CALC-MCNC: 127 MG/DL (ref 63–159)
NONHDLC SERPL-MCNC: 136 MG/DL
TRIGL SERPL-MCNC: 45 MG/DL (ref 30–150)

## 2023-07-28 PROCEDURE — 83001 ASSAY OF GONADOTROPIN (FSH): CPT | Performed by: NURSE PRACTITIONER

## 2023-07-28 PROCEDURE — 82947 ASSAY GLUCOSE BLOOD QUANT: CPT | Performed by: NURSE PRACTITIONER

## 2023-07-28 PROCEDURE — 36415 COLL VENOUS BLD VENIPUNCTURE: CPT | Performed by: NURSE PRACTITIONER

## 2023-07-28 PROCEDURE — 80061 LIPID PANEL: CPT | Performed by: NURSE PRACTITIONER

## 2023-08-22 ENCOUNTER — HOSPITAL ENCOUNTER (OUTPATIENT)
Dept: RADIOLOGY | Facility: HOSPITAL | Age: 53
Discharge: HOME | End: 2023-08-22
Attending: PLASTIC SURGERY
Payer: COMMERCIAL

## 2023-08-22 DIAGNOSIS — N63.10 BREAST MASS, RIGHT: ICD-10-CM

## 2023-08-22 PROCEDURE — 76642 ULTRASOUND BREAST LIMITED: CPT | Mod: 26,RT,, | Performed by: RADIOLOGY

## 2023-08-22 PROCEDURE — 76642 ULTRASOUND BREAST LIMITED: CPT | Mod: TC,RT

## 2023-08-22 PROCEDURE — 76642 US BREAST RIGHT LIMITED: ICD-10-PCS | Mod: 26,RT,, | Performed by: RADIOLOGY

## 2023-12-08 ENCOUNTER — ANESTHESIA (OUTPATIENT)
Dept: ENDOSCOPY | Facility: HOSPITAL | Age: 53
End: 2023-12-08
Payer: COMMERCIAL

## 2023-12-08 ENCOUNTER — HOSPITAL ENCOUNTER (OUTPATIENT)
Facility: HOSPITAL | Age: 53
Discharge: HOME | End: 2023-12-08
Attending: INTERNAL MEDICINE | Admitting: INTERNAL MEDICINE
Payer: COMMERCIAL

## 2023-12-08 ENCOUNTER — ANESTHESIA EVENT (OUTPATIENT)
Dept: ENDOSCOPY | Facility: HOSPITAL | Age: 53
End: 2023-12-08
Payer: COMMERCIAL

## 2023-12-08 DIAGNOSIS — Z12.11 SCREENING FOR COLON CANCER: ICD-10-CM

## 2023-12-08 LAB
B-HCG UR QL: NEGATIVE
CTP QC/QA: YES

## 2023-12-08 PROCEDURE — D9220A PRA ANESTHESIA: ICD-10-PCS | Mod: CRNA,,, | Performed by: NURSE ANESTHETIST, CERTIFIED REGISTERED

## 2023-12-08 PROCEDURE — G0121 COLON CA SCRN NOT HI RSK IND: ICD-10-PCS | Mod: ,,, | Performed by: INTERNAL MEDICINE

## 2023-12-08 PROCEDURE — 25000003 PHARM REV CODE 250: Performed by: NURSE ANESTHETIST, CERTIFIED REGISTERED

## 2023-12-08 PROCEDURE — D9220A PRA ANESTHESIA: Mod: CRNA,,, | Performed by: NURSE ANESTHETIST, CERTIFIED REGISTERED

## 2023-12-08 PROCEDURE — 37000009 HC ANESTHESIA EA ADD 15 MINS: Performed by: INTERNAL MEDICINE

## 2023-12-08 PROCEDURE — 81025 URINE PREGNANCY TEST: CPT | Performed by: INTERNAL MEDICINE

## 2023-12-08 PROCEDURE — 63600175 PHARM REV CODE 636 W HCPCS: Performed by: NURSE ANESTHETIST, CERTIFIED REGISTERED

## 2023-12-08 PROCEDURE — G0121 COLON CA SCRN NOT HI RSK IND: HCPCS | Mod: ,,, | Performed by: INTERNAL MEDICINE

## 2023-12-08 PROCEDURE — G0121 COLON CA SCRN NOT HI RSK IND: HCPCS | Performed by: INTERNAL MEDICINE

## 2023-12-08 PROCEDURE — D9220A PRA ANESTHESIA: ICD-10-PCS | Mod: ANES,,, | Performed by: ANESTHESIOLOGY

## 2023-12-08 PROCEDURE — D9220A PRA ANESTHESIA: Mod: ANES,,, | Performed by: ANESTHESIOLOGY

## 2023-12-08 PROCEDURE — 37000008 HC ANESTHESIA 1ST 15 MINUTES: Performed by: INTERNAL MEDICINE

## 2023-12-08 RX ORDER — LIDOCAINE HYDROCHLORIDE 20 MG/ML
INJECTION INTRAVENOUS
Status: DISCONTINUED | OUTPATIENT
Start: 2023-12-08 | End: 2023-12-08

## 2023-12-08 RX ORDER — PROPOFOL 10 MG/ML
VIAL (ML) INTRAVENOUS
Status: DISCONTINUED | OUTPATIENT
Start: 2023-12-08 | End: 2023-12-08

## 2023-12-08 RX ORDER — SODIUM CHLORIDE 9 MG/ML
INJECTION, SOLUTION INTRAVENOUS CONTINUOUS
Status: DISCONTINUED | OUTPATIENT
Start: 2023-12-08 | End: 2023-12-08 | Stop reason: HOSPADM

## 2023-12-08 RX ADMIN — PROPOFOL 50 MG: 10 INJECTION, EMULSION INTRAVENOUS at 11:12

## 2023-12-08 RX ADMIN — PROPOFOL 100 MG: 10 INJECTION, EMULSION INTRAVENOUS at 11:12

## 2023-12-08 RX ADMIN — LIDOCAINE HYDROCHLORIDE 100 MG: 20 INJECTION, SOLUTION INTRAVENOUS at 11:12

## 2023-12-08 NOTE — PLAN OF CARE
Vss, bill po fluids, denies pain, ambulates easily. IV removed, catheter intact. Discharge instructions provided and states understanding. States ready to go home.  Discharged from facility with family per wheelchair.

## 2023-12-08 NOTE — ANESTHESIA PREPROCEDURE EVALUATION
12/08/2023  Keren Tong is a 53 y.o., female.      Pre-op Assessment    I have reviewed the Patient Summary Reports.     I have reviewed the Nursing Notes. I have reviewed the NPO Status.   I have reviewed the Medications.     Review of Systems  Anesthesia Hx:  No problems with previous Anesthesia                Cardiovascular:  Cardiovascular Normal                                            Pulmonary:  Pulmonary Normal                       Neurological:  Neurology Normal                                      Psych:  Psychiatric History                  Physical Exam  General: Well nourished    Airway:  Mallampati: II   Mouth Opening: Normal  TM Distance: Normal  Neck ROM: Normal ROM        Anesthesia Plan  Type of Anesthesia, risks & benefits discussed:    Anesthesia Type: Gen Natural Airway  Intra-op Monitoring Plan: Standard ASA Monitors  Induction:  IV  Informed Consent: Informed consent signed with the Patient and all parties understand the risks and agree with anesthesia plan.  All questions answered.   ASA Score: 1    Ready For Surgery From Anesthesia Perspective.     .

## 2023-12-08 NOTE — ANESTHESIA POSTPROCEDURE EVALUATION
Anesthesia Post Evaluation    Patient: Keren Tong    Procedure(s) Performed: Procedure(s) (LRB):  COLONOSCOPY (N/A)    Final Anesthesia Type: general      Patient location during evaluation: PACU  Patient participation: Yes- Able to Participate  Level of consciousness: awake and alert  Post-procedure vital signs: reviewed and stable  Pain management: adequate  Airway patency: patent    PONV status at discharge: No PONV  Anesthetic complications: no      Cardiovascular status: blood pressure returned to baseline  Respiratory status: unassisted  Hydration status: euvolemic  Follow-up not needed.              Vitals Value Taken Time   /78 12/08/23 1208   Temp 36.6 °C (97.9 °F) 12/08/23 1147   Pulse 78 12/08/23 1210   Resp 16 12/08/23 1155   SpO2 99 % 12/08/23 1210   Vitals shown include unvalidated device data.      Event Time   Out of Recovery 12:18:04         Pain/Shiloh Score: Shiloh Score: 10 (12/8/2023 12:05 PM)

## 2023-12-08 NOTE — PROVATION PATIENT INSTRUCTIONS
Discharge Summary/Instructions after an Endoscopic Procedure  Patient Name: Keren Tong  Patient MRN: [id number]  Patient YOB: 1970 Friday, December 8, 2023  Molly Miller MD  Dear patient,  As a result of recent federal legislation (The Federal Cures Act), you may   receive lab or pathology results from your procedure in your [facility]   account before your physician is able to contact you. Your physician or   their representative will relay the results to you with their   recommendations at their soonest availability.  Thank you,  RESTRICTIONS:  During your procedure today, you received medications for sedation.  These   medications may affect your judgment, balance and coordination.  Therefore,   for 24 hours, you have the following restrictions:   - DO NOT drive a car, operate machinery, make legal/financial decisions,   sign important papers or drink alcohol.    ACTIVITY:  Today: no heavy lifting, straining or running due to procedural   sedation/anesthesia.  The following day: return to full activity including work.  DIET:  Eat and drink normally unless instructed otherwise.     TREATMENT FOR COMMON SIDE EFFECTS:  - Mild abdominal pain, nausea, belching, bloating or excessive gas:  rest,   eat lightly and use a heating pad.  - Sore Throat: treat with throat lozenges and/or gargle with warm salt   water.  - Because air was used during the procedure, expelling large amounts of air   from your rectum or belching is normal.  - If a bowel prep was taken, you may not have a bowel movement for 1-3 days.    This is normal.  SYMPTOMS TO WATCH FOR AND REPORT TO YOUR PHYSICIAN:  1. Abdominal pain or bloating, other than gas cramps.  2. Chest pain.  3. Back pain.  4. Signs of infection such as: chills or fever occurring within 24 hours   after the procedure.  5. Rectal bleeding, which would show as bright red, maroon, or black stools.   (A tablespoon of blood from the rectum is not serious,  especially if   hemorrhoids are present.)  6. Vomiting.  7. Weakness or dizziness.  GO DIRECTLY TO THE NEAREST EMERGENCY ROOM IF YOU HAVE ANY OF THE FOLLOWING:      Difficulty breathing              Chills and/or fever over 101 F   Persistent vomiting and/or vomiting blood   Severe abdominal pain   Severe chest pain   Black, tarry stools   Bleeding- more than one tablespoon   Any other symptom or condition that you feel may need urgent attention  Your doctor recommends these additional instructions:  If any biopsies were taken, your doctors clinic will contact you in 1 to 2   weeks with any results.  - Discharge patient to home (with escort).   - Patient has a contact number available for emergencies.  The signs and   symptoms of potential delayed complications were discussed with the   patient.  Return to normal activities tomorrow.  Written discharge   instructions were provided to the patient.   - Resume previous diet.   - Continue present medications.   - Repeat colonoscopy in 10 years for screening purposes.   - Return to my office PRN.  For questions, problems or results please call your physician - Molly Miller MD at Work:  [phone].  [facility], EMERGENCY ROOM PHONE NUMBER: [phone]  IF A COMPLICATION OR EMERGENCY SITUATION ARISES AND YOU ARE UNABLE TO REACH   YOUR PHYSICIAN - GO DIRECTLY TO THE EMERGENCY ROOM.  Molly Miller MD  12/8/2023 11:44:51 AM  This report has been verified and signed electronically.  Dear patient,  As a result of recent federal legislation (The Federal Cures Act), you may   receive lab or pathology results from your procedure in your [facility]   account before your physician is able to contact you. Your physician or   their representative will relay the results to you with their   recommendations at their soonest availability.  Thank you,  PROVATION

## 2023-12-08 NOTE — H&P
"[facility] Note    CC: Screening colonoscopy    HPI 53 y.o. female presents for initial routine screening colonsocopy    Past Medical History:   Diagnosis Date    ADHD     Thyroid disease        Allergies and Medications reviewed     Review of Systems  General ROS: negative for - chills, fever or weight loss  Cardiovascular ROS: no chest pain or dyspnea on exertion  Gastrointestinal ROS: no blood in stool    Physical Examination  /76 (BP Location: Left arm, Patient Position: Lying)   Pulse 102   Temp 97.7 °F (36.5 °C) (Skin)   Resp 16   Ht 5' 6" (1.676 m)   Wt 69.4 kg (153 lb)   SpO2 100%   Breastfeeding No   BMI 24.69 kg/m²   General appearance: alert, cooperative, no distress  HENT: Normocephalic, atraumatic, neck symmetrical, no nasal discharge, sclera anicteric   Lungs: clear to auscultation bilaterally, symmetric chest wall expansion bilaterally  Heart: regular rate and rhythm without rub; no displacement of the PMI   Abdomen: soft  Extremities: extremities symmetric; no clubbing, cyanosis, or edema        Labs:  Lab Results   Component Value Date    WBC 4.39 03/08/2022    HGB 11.7 (L) 03/08/2022    HCT 37.7 03/08/2022    MCV 90 03/08/2022     03/08/2022       Assessment:   53 y.o. female presents for colonoscopy    Plan:  -Proceed to colonoscopy     Molly Miller MD  [facility]  [address], [address]  [place] [address]  Office: [phone]  Fax: [phone]   "

## 2023-12-08 NOTE — TRANSFER OF CARE
"Anesthesia Transfer of Care Note    Patient: Keren Tong    Procedure(s) Performed: Procedure(s) (LRB):  COLONOSCOPY (N/A)    Patient location: PACU    Anesthesia Type: general    Transport from OR: Transported from OR on room air with adequate spontaneous ventilation    Post pain: adequate analgesia    Post assessment: no apparent anesthetic complications and tolerated procedure well    Post vital signs: stable    Level of consciousness: responds to stimulation and sedated    Nausea/Vomiting: no nausea/vomiting    Complications: none    Transfer of care protocol was followed    Last vitals: Visit Vitals  /76 (BP Location: Left arm, Patient Position: Lying)   Pulse 102   Temp 36.5 °C (97.7 °F) (Skin)   Resp 16   Ht 5' 6" (1.676 m)   Wt 69.4 kg (153 lb)   SpO2 100%   Breastfeeding No   BMI 24.69 kg/m²     "

## 2023-12-11 VITALS
HEIGHT: 66 IN | DIASTOLIC BLOOD PRESSURE: 78 MMHG | BODY MASS INDEX: 24.59 KG/M2 | HEART RATE: 90 BPM | WEIGHT: 153 LBS | TEMPERATURE: 98 F | RESPIRATION RATE: 16 BRPM | OXYGEN SATURATION: 99 % | SYSTOLIC BLOOD PRESSURE: 125 MMHG

## 2024-05-01 NOTE — PROGRESS NOTES
"[facility]   Physical Therapy Treatment Note     Name: Keren THOMAS [facility] Number: [id number]    Therapy Diagnosis:   Encounter Diagnoses   Name Primary?    Limited joint range of motion (ROM) Yes    Shoulder weakness     Rotator cuff syndrome of right shoulder      Physician: Jose Angel Isaac,*    Visit Date: 4/21/2022    Physician Orders: PT Eval and Treat   Medical Diagnosis from Referral: Rotator cuff syndrome of right shoulder  Evaluation Date: 3/22/2022  Authorization Period Expiration: 12/31/2022  Plan of Care Expiration: 5/5/2022  Progress Note Due: 5/5/2022  Visit # / Visits authorized: 7/ 12 (8 total)  FOTO: Survey completed 4/7/2022  41% residual deficit   Next survey due week of 4/25/2022    Precautions: Standard     PTA Visit #: 0/5     Time In: 0938  Time Out: 1038  Total Billable Time: 39 minutes    SUBJECTIVE     Pt reports: "This is hard.  It's not going" (in ref to pulley assisted IR behind back)  She was somewhat compliant with home exercise program.  Response to previous treatment: Felt a little better than when I came in  Functional change: No significant change since last visit    Pain: 5/10  Location: right anterolateral shoulder      OBJECTIVE     Objective Measures updated at progress report unless specified.     Treatment     Keren received the treatments listed below:  (patient arrived late for session)    therapeutic exercises to develop strength and ROM for 31 minutes including (new exercises indicated in bold print):   UBE L2 3' fwd/3' back   Pulley assisted ROM x 2' ea    Flexion    Abduction    IR behind back x 1'   Doorway stretch 3x30s    Wall climbs 2 x 10   Plyoball toss 1/2 kg ball x 10 (B UE)   Standing wand exercises 2# 2x10 ea    Flexion    Abduction    ER/IR    Extension    IR behind back    Theraband resisted shoulder exercises with green theraband 2x10    Extension    Rows    Resisted ER with red theraband 2x10   Supine wand exercises " 2# bar 2x10 ea    Serratus punch    ER/IR @ 90* horizontal abd   Supine butterfly (shoulder rotation with abd)    Prone shoulder exercises 2x10 ea    Extension    Rows     Flexion    manual therapy techniques: Joint mobilizations and PROM were applied to the: R shoulder for 8 minutes, including:   Grade II/III superior to inferior glide, anterior to posterior glide, lateral distraction to R GH joint   PROM to R shoulder     Cold pack to R shoulder for 7 minutes following exercise and manual therapy    Patient Education and Home Exercises     Home Exercises Provided and Patient Education Provided     Education provided:   - Added exercises as indicated in bold print above.  Discussed safe lifting (use B UE and shift weight more to L until R shoulder better healed, limited overhead lifting with R UE to light dishes only)    Written Home Exercises Provided: Instructed patient to continue prior HEP. Exercises were reviewed and Keren was able to demonstrate them prior to the end of the session.  Keren demonstrated good  understanding of the education provided. See EMR under Patient Instructions for exercises provided during therapy sessions    ASSESSMENT     Improved passive flexion and ER  IR also improved but not as much as flexion and ER.  Decreased discomfort reported with exercise program    Keren Is progressing well towards her goals.   Pt prognosis is Good.     Pt will continue to benefit from skilled outpatient physical therapy to address the deficits listed in the problem list box on initial evaluation, provide pt/family education and to maximize pt's level of independence in the home and community environment.     Pt's spiritual, cultural and educational needs considered and pt agreeable to plan of care and goals.     Anticipated barriers to physical therapy: None    Goals:   Short Term Goals: 3 weeks    1) Decrease max pain to < 5/10 Minimal progress   2) Facilitate improved upper quadrant flexibility  Progressing   3) Decrease tenderness to minimal Progressing   4) Patient will consistently reach to top of head using R UE to allow for ease in hair dressing. Progressing    Long Term Goals: 6 weeks    1) Achieve > 75% of normal AROM of R shoulder to allow reaching to 2nd shelf in cabinet Progressing   2) Patient will sleep > 6 hours per night without disturbance by shoulder pain  Minimal progress   3) Patient will resume light housework  Minimal progress   4) Patient will consistently perform all self care activities without restriction Progressing   5) Decrease functional deficit to < 40% as indicated by FOTO shoulder survey Progressing/Modified 4/7/2022:  Decrease functional deficit to < 30% as indicated by FOTO shoulder survey   6) Patient will be independent in HEP for ROM and light strengthening   Progressing      PLAN     Scapular mobilization, AC mobilization as needed.  Progressive stretching/strengtheninig    Laura Marcum, PT      01-May-2024 13:13

## 2024-06-12 ENCOUNTER — HOSPITAL ENCOUNTER (OUTPATIENT)
Dept: RADIOLOGY | Facility: HOSPITAL | Age: 54
Discharge: HOME | End: 2024-06-12
Attending: NURSE PRACTITIONER
Payer: COMMERCIAL

## 2024-06-12 DIAGNOSIS — R92.8 ABNORMAL MAMMOGRAM OF RIGHT BREAST: ICD-10-CM

## 2024-06-12 PROCEDURE — 77066 DX MAMMO INCL CAD BI: CPT | Mod: 26,,, | Performed by: RADIOLOGY

## 2024-06-12 PROCEDURE — 77066 DX MAMMO INCL CAD BI: CPT | Mod: TC

## 2024-06-12 PROCEDURE — 77062 BREAST TOMOSYNTHESIS BI: CPT | Mod: 26,,, | Performed by: RADIOLOGY

## 2024-10-09 DIAGNOSIS — M54.12 RADICULOPATHY, CERVICAL: Primary | ICD-10-CM

## 2024-10-10 DIAGNOSIS — M89.9 DISORDER OF BONE, UNSPECIFIED: Primary | ICD-10-CM

## 2024-10-11 ENCOUNTER — HOSPITAL ENCOUNTER (OUTPATIENT)
Dept: RADIOLOGY | Facility: HOSPITAL | Age: 54
Discharge: HOME | End: 2024-10-11
Attending: NURSE PRACTITIONER
Payer: COMMERCIAL

## 2024-10-11 DIAGNOSIS — M89.9 DISORDER OF BONE, UNSPECIFIED: ICD-10-CM

## 2024-10-11 PROCEDURE — 72195 MRI PELVIS W/O DYE: CPT | Mod: 26,,, | Performed by: RADIOLOGY

## 2024-10-11 PROCEDURE — 72195 MRI PELVIS W/O DYE: CPT | Mod: TC

## 2025-02-03 ENCOUNTER — OFFICE VISIT (OUTPATIENT)
Dept: FAMILY MEDICINE | Facility: CLINIC | Age: 55
End: 2025-02-03
Payer: COMMERCIAL

## 2025-02-03 VITALS
HEART RATE: 105 BPM | HEIGHT: 64 IN | BODY MASS INDEX: 24.02 KG/M2 | OXYGEN SATURATION: 99 % | SYSTOLIC BLOOD PRESSURE: 110 MMHG | DIASTOLIC BLOOD PRESSURE: 70 MMHG | WEIGHT: 140.69 LBS

## 2025-02-03 DIAGNOSIS — Z76.89 ENCOUNTER TO ESTABLISH CARE: Primary | ICD-10-CM

## 2025-02-03 DIAGNOSIS — Z11.59 NEED FOR HEPATITIS C SCREENING TEST: ICD-10-CM

## 2025-02-03 DIAGNOSIS — Z11.4 ENCOUNTER FOR SCREENING FOR HUMAN IMMUNODEFICIENCY VIRUS (HIV): ICD-10-CM

## 2025-02-03 DIAGNOSIS — F90.2 ATTENTION DEFICIT HYPERACTIVITY DISORDER (ADHD), COMBINED TYPE: ICD-10-CM

## 2025-02-03 DIAGNOSIS — E03.9 ACQUIRED HYPOTHYROIDISM: ICD-10-CM

## 2025-02-03 LAB
AMP AMPHETAMINE 1000 NM/ML POC: ABNORMAL
BAR BARBITURATES 300 NG/ML POC: NEGATIVE
BUP BUPRENORPHINE 10 NG/ML POC: NEGATIVE
BZO BENZODIAZEPINES 300 NG/ML POC: NEGATIVE
COC COCAINE 300 NG/ML POC: NEGATIVE
CREATININE (CR) POC: 50
CTP QC/QA: YES
MET METHAMPHETAMINE 1000 NG/ML POC: NEGATIVE
MOP/OPI300 MORPHINE 300 NG/ML POC: NEGATIVE
MTD METHADONE 300 NG/ML POC: NEGATIVE
OXIDANT (OX) POC: NEGATIVE
OXY OXYCODONE 100 NG/ML POC: NEGATIVE
SPECIFIC GRAVITY (SG) POC: 1.01
TEMPERATURE (°F) POC: 92
THC MARIJUANA 50 NG/ML POC: NEGATIVE

## 2025-02-03 PROCEDURE — 99999 PR PBB SHADOW E&M-EST. PATIENT-LVL III: CPT | Mod: PBBFAC,,, | Performed by: NURSE PRACTITIONER

## 2025-02-03 PROCEDURE — 3078F DIAST BP <80 MM HG: CPT | Mod: S$GLB,,, | Performed by: NURSE PRACTITIONER

## 2025-02-03 PROCEDURE — 99204 OFFICE O/P NEW MOD 45 MIN: CPT | Mod: S$GLB,,, | Performed by: NURSE PRACTITIONER

## 2025-02-03 PROCEDURE — 1160F RVW MEDS BY RX/DR IN RCRD: CPT | Mod: S$GLB,,, | Performed by: NURSE PRACTITIONER

## 2025-02-03 PROCEDURE — 1159F MED LIST DOCD IN RCRD: CPT | Mod: S$GLB,,, | Performed by: NURSE PRACTITIONER

## 2025-02-03 PROCEDURE — 3008F BODY MASS INDEX DOCD: CPT | Mod: S$GLB,,, | Performed by: NURSE PRACTITIONER

## 2025-02-03 PROCEDURE — 3074F SYST BP LT 130 MM HG: CPT | Mod: S$GLB,,, | Performed by: NURSE PRACTITIONER

## 2025-02-03 PROCEDURE — 80305 DRUG TEST PRSMV DIR OPT OBS: CPT | Mod: QW,S$GLB,, | Performed by: NURSE PRACTITIONER

## 2025-02-03 RX ORDER — DEXTROAMPHETAMINE SACCHARATE, AMPHETAMINE ASPARTATE, DEXTROAMPHETAMINE SULFATE AND AMPHETAMINE SULFATE 7.5; 7.5; 7.5; 7.5 MG/1; MG/1; MG/1; MG/1
1 TABLET ORAL 2 TIMES DAILY
Qty: 60 TABLET | Refills: 0 | Status: SHIPPED | OUTPATIENT
Start: 2025-02-03 | End: 2025-02-28 | Stop reason: SDUPTHER

## 2025-02-03 NOTE — PROGRESS NOTES
dSubjective:       Patient ID: Keren Tong is a 54 y.o. female.    Chief Complaint:     History of Present Illness    CHIEF COMPLAINT:  Patient presents today to [facility], was under the care of Zeynep Gentile NP in [place] but provider abruptly is gone from clinic.     ADHD:  She has been on Adderall for 10 years, currently prescribed 30mg twice daily. She reports hyperactive type symptoms since childhood, describing significant hyperactivity and difficulty staying still. These symptoms are causing strain in her marriage.    MUSCULOSKELETAL:  She reports progressive worsening of hand symptoms extending up her finger. She has a family history of rheumatoid arthritis in her grandmother. She is currently under evaluation at the [facility] with full body MRI and x-rays completed.    MEDICAL HISTORY:  She has been on Synthroid 25 mcg since 4753-1701 for thyroid condition.    SURGICAL HISTORY:  History of breast surgery.    SOCIAL HISTORY:  She reports rare social alcohol use and has been  since 2014.     HX and MAR updated by NP  -       Past Medical History:   Diagnosis Date    ADHD 2014    Thyroid disease 2021       Past Surgical History:   Procedure Laterality Date    APPENDECTOMY      ARTHROSCOPIC ACROMIOPLASTY OF SHOULDER  03/11/2022    Procedure: ACROMIOPLASTY, ARTHROSCOPIC;  Surgeon: Jose Angel Isaac MD;  Location: [facility] OR;  Service: Orthopedics;;    ARTHROSCOPIC DEBRIDEMENT OF SHOULDER  03/11/2022    Procedure: DEBRIDEMENT, SHOULDER, ARTHROSCOPIC;  Surgeon: Jose Angel Isaac MD;  Location: [facility] OR;  Service: Orthopedics;;    ARTHROSCOPY OF SHOULDER WITH DECOMPRESSION OF SUBACROMIAL SPACE  03/11/2022    Procedure: ARTHROSCOPY, SHOULDER, WITH SUBACROMIAL SPACE DECOMPRESSION;  Surgeon: Jose Angel Isaac MD;  Location: [facility] OR;  Service: Orthopedics;;    AUGMENTATION OF BREAST      BREAST SURGERY      implants removed    CARPAL TUNNEL RELEASE      COLONOSCOPY N/A  2023    Procedure: COLONOSCOPY;  Surgeon: Molly Miller MD;  Location: [facility];  Service: Endoscopy;  Laterality: N/A;    OOPHORECTOMY          Social History     Socioeconomic History    Marital status:     Number of children: 0    Highest education level: Associate degree: academic program   Occupational History    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   Tobacco Use    Smoking status: Former     Current packs/day: 0.00     Types: Cigarettes     Quit date: 2020     Years since quittin.9    Smokeless tobacco: Never   Substance and Sexual Activity    Alcohol use: Not Currently     Comment: rare socail    Drug use: Never    Sexual activity: Yes     Partners: Male     Social Drivers of Health     Financial Resource Strain: Low Risk  (2025)    Overall Financial Resource Strain (CARDIA)     Difficulty of Paying Living Expenses: Not hard at all   Food Insecurity: No Food Insecurity (2025)    Hunger Vital Sign     Worried About Running Out of Food in the Last Year: Never true     Ran Out of Food in the Last Year: Never true   Physical Activity: Inactive (2025)    Exercise Vital Sign     Days of Exercise per Week: 3 days     Minutes of Exercise per Session: 0 min   Stress: No Stress Concern Present (2025)    [employer] - Occupational Stress Questionnaire     Feeling of Stress : Only a little   Housing Stability: Unknown (2025)    Housing Stability Vital Sign     Unable to Pay for Housing in the Last Year: No       Family History   Problem Relation Name Age of Onset    Hypertension Mother      Hypertension Father      Cancer Maternal Cousin          maternal second cousin       Review of patient's allergies indicates:  No Known Allergies       Current Outpatient Medications:     dextroamphetamine-amphetamine 30 mg Tab, Take 1 tablet (30 mg total) by mouth 2 (two) times daily., Disp: 60 tablet, Rfl: 0    levothyroxine (SYNTHROID) 25 MCG  tablet, Take 25 mcg by mouth before breakfast., Disp: , Rfl:     HPI  Review of Systems   Constitutional: Negative.    HENT: Negative.     Eyes: Negative.    Respiratory: Negative.     Cardiovascular: Negative.    Gastrointestinal: Negative.    Endocrine: Negative.    Genitourinary: Negative.    Musculoskeletal: Negative.    Skin: Negative.    Allergic/Immunologic: Negative.    Neurological: Negative.    Hematological: Negative.    Psychiatric/Behavioral:  Positive for decreased concentration.              Objective:      Physical Exam  Vitals and nursing note reviewed.   Constitutional:       General: She is not in acute distress.     Appearance: Normal appearance. She is well-developed and normal weight. She is not ill-appearing.   HENT:      Head: Normocephalic.   Eyes:      Conjunctiva/sclera: Conjunctivae normal.   Neck:      Thyroid: No thyromegaly.   Cardiovascular:      Rate and Rhythm: Normal rate and regular rhythm.      Heart sounds: Normal heart sounds. No murmur heard.  Pulmonary:      Effort: Pulmonary effort is normal.      Breath sounds: Normal breath sounds. No wheezing or rales.   Musculoskeletal:         General: Normal range of motion.      Cervical back: Normal range of motion.      Right lower leg: No edema.      Left lower leg: No edema.      Comments: Bilateral hands with swollen joints   Skin:     General: Skin is warm and dry.   Neurological:      Mental Status: She is alert and oriented to person, place, and time. Mental status is at baseline.   Psychiatric:         Mood and Affect: Mood normal.         Behavior: Behavior normal.         Thought Content: Thought content normal.         Judgment: Judgment normal.         Assessment:      1. Encounter to establish care    2. Attention deficit hyperactivity disorder (ADHD), combined type    3. Need for hepatitis C screening test    4. Encounter for screening for human immunodeficiency virus (HIV)    5. Acquired hypothyroidism        Plan:     1- Dr. Ranjit Gray MD psychiatrist in [facility], will request ADHD records from his old system since tested in 2014.   2- adderall refilled  3- contract signed  4-UDS   5- NF labs  6- RTC 3 mo  -      Encounter to establish care    Attention deficit hyperactivity disorder (ADHD), combined type  -     dextroamphetamine-amphetamine 30 mg Tab; Take 1 tablet (30 mg total) by mouth 2 (two) times daily.  Dispense: 60 tablet; Refill: 0  -     POCT Urine Drug Screen (With BUP)    Need for hepatitis C screening test  -     HIV 1/2 Ag/Ab (4th Gen); Future; Expected date: 02/04/2025  -     Hepatitis C Antibody; Future; Expected date: 02/03/2025  -     CBC Auto Differential; Future; Expected date: 02/03/2025  -     Comprehensive Metabolic Panel; Future; Expected date: 02/03/2025  -     TSH; Future; Expected date: 02/03/2025    Encounter for screening for human immunodeficiency virus (HIV)  -     HIV 1/2 Ag/Ab (4th Gen); Future; Expected date: 02/04/2025  -     Hepatitis C Antibody; Future; Expected date: 02/03/2025  -     CBC Auto Differential; Future; Expected date: 02/03/2025  -     Comprehensive Metabolic Panel; Future; Expected date: 02/03/2025  -     TSH; Future; Expected date: 02/03/2025    Acquired hypothyroidism  -     HIV 1/2 Ag/Ab (4th Gen); Future; Expected date: 02/04/2025  -     Hepatitis C Antibody; Future; Expected date: 02/03/2025  -     CBC Auto Differential; Future; Expected date: 02/03/2025  -     Comprehensive Metabolic Panel; Future; Expected date: 02/03/2025  -     TSH; Future; Expected date: 02/03/2025        Risks, benefits, and side effects were discussed with the patient. All questions were answered to the fullest satisfaction of the patient, and pt verbalized understanding and agreement to treatment plan. Pt was to call with any new or worsening symptoms, or present to the ER.        This note was generated with the assistance of ambient listening technology. Verbal consent was obtained by the  patient and accompanying visitor(s) for the recording of patient appointment to facilitate this note. I attest to having reviewed and edited the generated note for accuracy, though some syntax or spelling errors may persist. Please contact the author of this note for any clarification.

## 2025-02-06 ENCOUNTER — LAB VISIT (OUTPATIENT)
Dept: LAB | Facility: HOSPITAL | Age: 55
End: 2025-02-06
Attending: NURSE PRACTITIONER
Payer: COMMERCIAL

## 2025-02-06 DIAGNOSIS — E03.9 ACQUIRED HYPOTHYROIDISM: ICD-10-CM

## 2025-02-06 DIAGNOSIS — Z11.59 NEED FOR HEPATITIS C SCREENING TEST: ICD-10-CM

## 2025-02-06 DIAGNOSIS — Z11.4 ENCOUNTER FOR SCREENING FOR HUMAN IMMUNODEFICIENCY VIRUS (HIV): ICD-10-CM

## 2025-02-06 LAB
ALBUMIN SERPL BCP-MCNC: 4 G/DL (ref 3.5–5.2)
ALP SERPL-CCNC: 78 U/L (ref 40–150)
ALT SERPL W/O P-5'-P-CCNC: 20 U/L (ref 10–44)
ANION GAP SERPL CALC-SCNC: 13 MMOL/L (ref 8–16)
AST SERPL-CCNC: 15 U/L (ref 10–40)
BASOPHILS # BLD AUTO: 0.04 K/UL (ref 0–0.2)
BASOPHILS NFR BLD: 0.8 % (ref 0–1.9)
BILIRUB SERPL-MCNC: 0.5 MG/DL (ref 0.1–1)
BUN SERPL-MCNC: 21 MG/DL (ref 6–20)
CALCIUM SERPL-MCNC: 9.4 MG/DL (ref 8.7–10.5)
CHLORIDE SERPL-SCNC: 103 MMOL/L (ref 95–110)
CO2 SERPL-SCNC: 22 MMOL/L (ref 23–29)
CREAT SERPL-MCNC: 0.8 MG/DL (ref 0.5–1.4)
DIFFERENTIAL METHOD BLD: ABNORMAL
EOSINOPHIL # BLD AUTO: 0 K/UL (ref 0–0.5)
EOSINOPHIL NFR BLD: 0.8 % (ref 0–8)
ERYTHROCYTE [DISTWIDTH] IN BLOOD BY AUTOMATED COUNT: 13 % (ref 11.5–14.5)
EST. GFR  (NO RACE VARIABLE): >60 ML/MIN/1.73 M^2
GLUCOSE SERPL-MCNC: 130 MG/DL (ref 70–110)
HCT VFR BLD AUTO: 38.3 % (ref 37–48.5)
HCV AB SERPL QL IA: NORMAL
HGB BLD-MCNC: 12.3 G/DL (ref 12–16)
HIV 1+2 AB+HIV1 P24 AG SERPL QL IA: NORMAL
IMM GRANULOCYTES # BLD AUTO: 0.01 K/UL (ref 0–0.04)
IMM GRANULOCYTES NFR BLD AUTO: 0.2 % (ref 0–0.5)
LYMPHOCYTES # BLD AUTO: 1.7 K/UL (ref 1–4.8)
LYMPHOCYTES NFR BLD: 32.5 % (ref 18–48)
MCH RBC QN AUTO: 28.7 PG (ref 27–31)
MCHC RBC AUTO-ENTMCNC: 32.1 G/DL (ref 32–36)
MCV RBC AUTO: 90 FL (ref 82–98)
MONOCYTES # BLD AUTO: 0.2 K/UL (ref 0.3–1)
MONOCYTES NFR BLD: 4.4 % (ref 4–15)
NEUTROPHILS # BLD AUTO: 3.2 K/UL (ref 1.8–7.7)
NEUTROPHILS NFR BLD: 61.3 % (ref 38–73)
NRBC BLD-RTO: 0 /100 WBC
PLATELET # BLD AUTO: 292 K/UL (ref 150–450)
PMV BLD AUTO: 8.6 FL (ref 9.2–12.9)
POTASSIUM SERPL-SCNC: 4 MMOL/L (ref 3.5–5.1)
PROT SERPL-MCNC: 7.2 G/DL (ref 6–8.4)
RBC # BLD AUTO: 4.28 M/UL (ref 4–5.4)
SODIUM SERPL-SCNC: 138 MMOL/L (ref 136–145)
TSH SERPL DL<=0.005 MIU/L-ACNC: 1.41 UIU/ML (ref 0.4–4)
WBC # BLD AUTO: 5.26 K/UL (ref 3.9–12.7)

## 2025-02-06 PROCEDURE — 85025 COMPLETE CBC W/AUTO DIFF WBC: CPT | Performed by: NURSE PRACTITIONER

## 2025-02-06 PROCEDURE — 84443 ASSAY THYROID STIM HORMONE: CPT | Performed by: NURSE PRACTITIONER

## 2025-02-06 PROCEDURE — 86803 HEPATITIS C AB TEST: CPT | Performed by: NURSE PRACTITIONER

## 2025-02-06 PROCEDURE — 87389 HIV-1 AG W/HIV-1&-2 AB AG IA: CPT | Performed by: NURSE PRACTITIONER

## 2025-02-06 PROCEDURE — 80053 COMPREHEN METABOLIC PANEL: CPT | Performed by: NURSE PRACTITIONER

## 2025-02-10 ENCOUNTER — PATIENT MESSAGE (OUTPATIENT)
Dept: FAMILY MEDICINE | Facility: CLINIC | Age: 55
End: 2025-02-10
Payer: COMMERCIAL

## 2025-02-10 ENCOUNTER — HOSPITAL ENCOUNTER (OUTPATIENT)
Dept: RADIOLOGY | Facility: HOSPITAL | Age: 55
Discharge: HOME | End: 2025-02-10
Attending: NURSE PRACTITIONER
Payer: COMMERCIAL

## 2025-02-10 DIAGNOSIS — N64.4 BREAST PAIN, RIGHT: ICD-10-CM

## 2025-02-10 DIAGNOSIS — N61.0 INFECTION OF RIGHT BREAST: Primary | ICD-10-CM

## 2025-02-10 DIAGNOSIS — E03.9 ACQUIRED HYPOTHYROIDISM: Primary | ICD-10-CM

## 2025-02-10 DIAGNOSIS — E03.9 ACQUIRED HYPOTHYROIDISM: ICD-10-CM

## 2025-02-10 DIAGNOSIS — N61.0 INFECTION OF RIGHT BREAST: ICD-10-CM

## 2025-02-10 PROCEDURE — 76641 ULTRASOUND BREAST COMPLETE: CPT | Mod: TC,RT

## 2025-02-10 PROCEDURE — 76641 ULTRASOUND BREAST COMPLETE: CPT | Mod: 26,RT,, | Performed by: RADIOLOGY

## 2025-02-10 RX ORDER — LEVOTHYROXINE SODIUM 25 UG/1
25 TABLET ORAL
Qty: 90 TABLET | Refills: 1 | Status: SHIPPED | OUTPATIENT
Start: 2025-02-10

## 2025-02-10 RX ORDER — LEVOTHYROXINE SODIUM 25 UG/1
25 TABLET ORAL
Status: CANCELLED | OUTPATIENT
Start: 2025-02-10

## 2025-02-18 ENCOUNTER — INFUSION (OUTPATIENT)
Dept: INFUSION THERAPY | Facility: HOSPITAL | Age: 55
End: 2025-02-18
Attending: NURSE PRACTITIONER
Payer: COMMERCIAL

## 2025-02-18 ENCOUNTER — TELEPHONE (OUTPATIENT)
Dept: FAMILY MEDICINE | Facility: CLINIC | Age: 55
End: 2025-02-18
Payer: COMMERCIAL

## 2025-02-18 VITALS
WEIGHT: 135 LBS | TEMPERATURE: 98 F | BODY MASS INDEX: 21.19 KG/M2 | SYSTOLIC BLOOD PRESSURE: 131 MMHG | HEART RATE: 90 BPM | HEIGHT: 67 IN | RESPIRATION RATE: 16 BRPM | OXYGEN SATURATION: 97 % | DIASTOLIC BLOOD PRESSURE: 79 MMHG

## 2025-02-18 DIAGNOSIS — N61.0 INFECTION OF RIGHT BREAST: ICD-10-CM

## 2025-02-18 DIAGNOSIS — A49.9 GRAM POSITIVE BACTERIAL INFECTION: Primary | ICD-10-CM

## 2025-02-18 DIAGNOSIS — N61.0 INFECTION OF RIGHT BREAST: Primary | ICD-10-CM

## 2025-02-18 DIAGNOSIS — A49.9 GRAM POSITIVE BACTERIAL INFECTION: ICD-10-CM

## 2025-02-18 PROCEDURE — 96365 THER/PROPH/DIAG IV INF INIT: CPT

## 2025-02-18 PROCEDURE — 25000003 PHARM REV CODE 250: Performed by: NURSE PRACTITIONER

## 2025-02-18 PROCEDURE — 63600175 PHARM REV CODE 636 W HCPCS: Performed by: NURSE PRACTITIONER

## 2025-02-18 RX ADMIN — SODIUM CHLORIDE 1000 MG: 9 INJECTION, SOLUTION INTRAVENOUS at 01:02

## 2025-02-18 NOTE — TELEPHONE ENCOUNTER
Pt under the care of Dr. Zamudio for right breast infection, positive culture gram positive rods. MD ordered Vanco 1 gm x1 IV but does not have privileges thus records received and uploaded and vanco ordered for MD.

## 2025-02-20 ENCOUNTER — TELEPHONE (OUTPATIENT)
Dept: FAMILY MEDICINE | Facility: CLINIC | Age: 55
End: 2025-02-20
Payer: COMMERCIAL

## 2025-02-21 ENCOUNTER — INFUSION (OUTPATIENT)
Dept: INFUSION THERAPY | Facility: HOSPITAL | Age: 55
End: 2025-02-21
Attending: NURSE PRACTITIONER
Payer: COMMERCIAL

## 2025-02-21 VITALS
RESPIRATION RATE: 16 BRPM | WEIGHT: 134.94 LBS | HEIGHT: 67 IN | BODY MASS INDEX: 21.18 KG/M2 | DIASTOLIC BLOOD PRESSURE: 77 MMHG | HEART RATE: 73 BPM | TEMPERATURE: 98 F | SYSTOLIC BLOOD PRESSURE: 134 MMHG | OXYGEN SATURATION: 99 %

## 2025-02-21 DIAGNOSIS — A49.9 GRAM POSITIVE BACTERIAL INFECTION: Primary | ICD-10-CM

## 2025-02-21 DIAGNOSIS — N61.0 INFECTION OF RIGHT BREAST: ICD-10-CM

## 2025-02-21 PROCEDURE — 63600175 PHARM REV CODE 636 W HCPCS: Performed by: NURSE PRACTITIONER

## 2025-02-21 PROCEDURE — 25000003 PHARM REV CODE 250: Performed by: NURSE PRACTITIONER

## 2025-02-21 PROCEDURE — 96365 THER/PROPH/DIAG IV INF INIT: CPT

## 2025-02-21 PROCEDURE — 96366 THER/PROPH/DIAG IV INF ADDON: CPT

## 2025-02-21 RX ADMIN — SODIUM CHLORIDE 1000 MG: 9 INJECTION, SOLUTION INTRAVENOUS at 12:02

## 2025-02-26 ENCOUNTER — TELEPHONE (OUTPATIENT)
Dept: FAMILY MEDICINE | Facility: CLINIC | Age: 55
End: 2025-02-26
Payer: COMMERCIAL

## 2025-02-26 ENCOUNTER — INFUSION (OUTPATIENT)
Dept: INFUSION THERAPY | Facility: HOSPITAL | Age: 55
End: 2025-02-26
Attending: NURSE PRACTITIONER
Payer: COMMERCIAL

## 2025-02-26 VITALS
RESPIRATION RATE: 15 BRPM | DIASTOLIC BLOOD PRESSURE: 69 MMHG | HEART RATE: 78 BPM | BODY MASS INDEX: 22.63 KG/M2 | TEMPERATURE: 98 F | HEIGHT: 67 IN | SYSTOLIC BLOOD PRESSURE: 109 MMHG | OXYGEN SATURATION: 99 % | WEIGHT: 144.19 LBS

## 2025-02-26 DIAGNOSIS — F90.2 ATTENTION DEFICIT HYPERACTIVITY DISORDER (ADHD), COMBINED TYPE: ICD-10-CM

## 2025-02-26 DIAGNOSIS — N61.0 INFECTION OF RIGHT BREAST: ICD-10-CM

## 2025-02-26 DIAGNOSIS — A49.9 GRAM POSITIVE BACTERIAL INFECTION: Primary | ICD-10-CM

## 2025-02-26 PROCEDURE — 25000003 PHARM REV CODE 250: Performed by: NURSE PRACTITIONER

## 2025-02-26 PROCEDURE — 96365 THER/PROPH/DIAG IV INF INIT: CPT

## 2025-02-26 PROCEDURE — 63600175 PHARM REV CODE 636 W HCPCS: Performed by: NURSE PRACTITIONER

## 2025-02-26 RX ORDER — VANCOMYCIN/0.9 % SOD CHLORIDE 1 G/100 ML
1000 PLASTIC BAG, INJECTION (ML) INTRAVENOUS
OUTPATIENT
Start: 2025-02-26

## 2025-02-26 RX ORDER — VANCOMYCIN/0.9 % SOD CHLORIDE 1 G/100 ML
1000 PLASTIC BAG, INJECTION (ML) INTRAVENOUS
Status: CANCELLED | OUTPATIENT
Start: 2025-02-26

## 2025-02-26 RX ADMIN — SODIUM CHLORIDE 1000 MG: 9 INJECTION, SOLUTION INTRAVENOUS at 03:02

## 2025-02-27 ENCOUNTER — INFUSION (OUTPATIENT)
Dept: INFUSION THERAPY | Facility: HOSPITAL | Age: 55
End: 2025-02-27
Attending: NURSE PRACTITIONER
Payer: COMMERCIAL

## 2025-02-27 VITALS
HEART RATE: 80 BPM | DIASTOLIC BLOOD PRESSURE: 71 MMHG | HEIGHT: 67 IN | SYSTOLIC BLOOD PRESSURE: 120 MMHG | RESPIRATION RATE: 16 BRPM | BODY MASS INDEX: 23.1 KG/M2 | WEIGHT: 147.19 LBS | OXYGEN SATURATION: 100 % | TEMPERATURE: 98 F

## 2025-02-27 DIAGNOSIS — A49.9 GRAM POSITIVE BACTERIAL INFECTION: Primary | ICD-10-CM

## 2025-02-27 DIAGNOSIS — N61.0 INFECTION OF RIGHT BREAST: ICD-10-CM

## 2025-02-27 PROCEDURE — 96365 THER/PROPH/DIAG IV INF INIT: CPT

## 2025-02-27 PROCEDURE — 25000003 PHARM REV CODE 250: Performed by: NURSE PRACTITIONER

## 2025-02-27 PROCEDURE — 63600175 PHARM REV CODE 636 W HCPCS: Performed by: NURSE PRACTITIONER

## 2025-02-27 PROCEDURE — 96366 THER/PROPH/DIAG IV INF ADDON: CPT

## 2025-02-27 RX ORDER — VANCOMYCIN/0.9 % SOD CHLORIDE 1 G/100 ML
1000 PLASTIC BAG, INJECTION (ML) INTRAVENOUS
Status: CANCELLED | OUTPATIENT
Start: 2025-02-27

## 2025-02-27 RX ADMIN — SODIUM CHLORIDE 1000 MG: 9 INJECTION, SOLUTION INTRAVENOUS at 03:02

## 2025-02-28 ENCOUNTER — INFUSION (OUTPATIENT)
Dept: INFUSION THERAPY | Facility: HOSPITAL | Age: 55
End: 2025-02-28
Attending: NURSE PRACTITIONER
Payer: COMMERCIAL

## 2025-02-28 VITALS
TEMPERATURE: 98 F | DIASTOLIC BLOOD PRESSURE: 72 MMHG | SYSTOLIC BLOOD PRESSURE: 119 MMHG | HEART RATE: 78 BPM | RESPIRATION RATE: 15 BRPM | OXYGEN SATURATION: 99 %

## 2025-02-28 DIAGNOSIS — A49.9 GRAM POSITIVE BACTERIAL INFECTION: Primary | ICD-10-CM

## 2025-02-28 DIAGNOSIS — N61.0 INFECTION OF RIGHT BREAST: ICD-10-CM

## 2025-02-28 PROCEDURE — 63600175 PHARM REV CODE 636 W HCPCS: Performed by: NURSE PRACTITIONER

## 2025-02-28 PROCEDURE — 96365 THER/PROPH/DIAG IV INF INIT: CPT

## 2025-02-28 PROCEDURE — 96366 THER/PROPH/DIAG IV INF ADDON: CPT

## 2025-02-28 PROCEDURE — 25000003 PHARM REV CODE 250: Performed by: NURSE PRACTITIONER

## 2025-02-28 RX ORDER — VANCOMYCIN/0.9 % SOD CHLORIDE 1 G/100 ML
1000 PLASTIC BAG, INJECTION (ML) INTRAVENOUS
Status: CANCELLED | OUTPATIENT
Start: 2025-02-28

## 2025-02-28 RX ORDER — DEXTROAMPHETAMINE SACCHARATE, AMPHETAMINE ASPARTATE, DEXTROAMPHETAMINE SULFATE AND AMPHETAMINE SULFATE 7.5; 7.5; 7.5; 7.5 MG/1; MG/1; MG/1; MG/1
1 TABLET ORAL 2 TIMES DAILY
Qty: 60 TABLET | Refills: 0 | Status: SHIPPED | OUTPATIENT
Start: 2025-02-28

## 2025-02-28 RX ADMIN — SODIUM CHLORIDE 1000 MG: 9 INJECTION, SOLUTION INTRAVENOUS at 01:02

## 2025-02-28 NOTE — PLAN OF CARE
Problem: Adult Inpatient Plan of Care  Goal: Plan of Care Review  2/28/2025 1316 by Brynn Perez RN  Outcome: Progressing  2/28/2025 1316 by Brynn Perez RN  Outcome: Progressing  Goal: Patient-Specific Goal (Individualized)  2/28/2025 1316 by Brynn Perez RN  Outcome: Progressing  2/28/2025 1316 by Brynn Perez, RN  Outcome: Progressing  Goal: Absence of Hospital-Acquired Illness or Injury  2/28/2025 1316 by Brynn Perez RN  Outcome: Progressing  2/28/2025 1316 by Brynn Perez, RN  Outcome: Progressing  Goal: Optimal Comfort and Wellbeing  2/28/2025 1316 by Brynn Perez RN  Outcome: Progressing  2/28/2025 1316 by Brynn Perez, RN  Outcome: Progressing  Goal: Readiness for Transition of Care  2/28/2025 1316 by Brynn Perez, RN  Outcome: Progressing  2/28/2025 1316 by Brynn Perez RN  Outcome: Progressing

## 2025-03-26 ENCOUNTER — PATIENT MESSAGE (OUTPATIENT)
Dept: FAMILY MEDICINE | Facility: CLINIC | Age: 55
End: 2025-03-26
Payer: COMMERCIAL

## 2025-04-07 DIAGNOSIS — F90.2 ATTENTION DEFICIT HYPERACTIVITY DISORDER (ADHD), COMBINED TYPE: ICD-10-CM

## 2025-04-07 RX ORDER — DEXTROAMPHETAMINE SACCHARATE, AMPHETAMINE ASPARTATE, DEXTROAMPHETAMINE SULFATE AND AMPHETAMINE SULFATE 7.5; 7.5; 7.5; 7.5 MG/1; MG/1; MG/1; MG/1
1 TABLET ORAL 2 TIMES DAILY
Qty: 60 TABLET | Refills: 0 | Status: SHIPPED | OUTPATIENT
Start: 2025-04-07

## 2025-04-30 ENCOUNTER — OFFICE VISIT (OUTPATIENT)
Dept: FAMILY MEDICINE | Facility: CLINIC | Age: 55
End: 2025-04-30

## 2025-04-30 VITALS
OXYGEN SATURATION: 98 % | SYSTOLIC BLOOD PRESSURE: 138 MMHG | HEIGHT: 67 IN | BODY MASS INDEX: 22.13 KG/M2 | DIASTOLIC BLOOD PRESSURE: 79 MMHG | WEIGHT: 141 LBS | HEART RATE: 78 BPM

## 2025-04-30 DIAGNOSIS — E03.9 ACQUIRED HYPOTHYROIDISM: ICD-10-CM

## 2025-04-30 DIAGNOSIS — F90.2 ATTENTION DEFICIT HYPERACTIVITY DISORDER (ADHD), COMBINED TYPE: Primary | ICD-10-CM

## 2025-04-30 DIAGNOSIS — B00.1 FEVER BLISTER: ICD-10-CM

## 2025-04-30 DIAGNOSIS — Z12.31 BREAST CANCER SCREENING BY MAMMOGRAM: ICD-10-CM

## 2025-04-30 LAB
AMP AMPHETAMINE 1000 NM/ML POC: ABNORMAL
BAR BARBITURATES 300 NG/ML POC: NEGATIVE
BUP BUPRENORPHINE 10 NG/ML POC: NEGATIVE
BZO BENZODIAZEPINES 300 NG/ML POC: NEGATIVE
COC COCAINE 300 NG/ML POC: NEGATIVE
CREATININE (CR) POC: 50
CTP QC/QA: YES
MET METHAMPHETAMINE 1000 NG/ML POC: NEGATIVE
MOP/OPI300 MORPHINE 300 NG/ML POC: NEGATIVE
MTD METHADONE 300 NG/ML POC: NEGATIVE
OXIDANT (OX) POC: NEGATIVE
OXY OXYCODONE 100 NG/ML POC: ABNORMAL
SPECIFIC GRAVITY (SG) POC: 1.01
TEMPERATURE (°F) POC: 94
THC MARIJUANA 50 NG/ML POC: NEGATIVE

## 2025-04-30 PROCEDURE — 99999PBSHW POCT URINE DRUG SCREEN (WITH BUP): Mod: PBBFAC,,,

## 2025-04-30 PROCEDURE — 99999 PR PBB SHADOW E&M-EST. PATIENT-LVL III: CPT | Mod: PBBFAC,,, | Performed by: NURSE PRACTITIONER

## 2025-04-30 PROCEDURE — 99213 OFFICE O/P EST LOW 20 MIN: CPT | Mod: S$PBB,,, | Performed by: NURSE PRACTITIONER

## 2025-04-30 PROCEDURE — 80305 DRUG TEST PRSMV DIR OPT OBS: CPT | Mod: PBBFAC | Performed by: NURSE PRACTITIONER

## 2025-04-30 PROCEDURE — 99213 OFFICE O/P EST LOW 20 MIN: CPT | Mod: PBBFAC | Performed by: NURSE PRACTITIONER

## 2025-04-30 RX ORDER — VALACYCLOVIR HYDROCHLORIDE 1 G/1
TABLET, FILM COATED ORAL
Qty: 30 TABLET | Refills: 3 | Status: SHIPPED | OUTPATIENT
Start: 2025-04-30

## 2025-04-30 RX ORDER — DEXTROAMPHETAMINE SACCHARATE, AMPHETAMINE ASPARTATE, DEXTROAMPHETAMINE SULFATE AND AMPHETAMINE SULFATE 7.5; 7.5; 7.5; 7.5 MG/1; MG/1; MG/1; MG/1
30 TABLET ORAL 2 TIMES DAILY
Qty: 60 TABLET | Refills: 0 | Status: SHIPPED | OUTPATIENT
Start: 2025-06-06

## 2025-04-30 RX ORDER — DEXTROAMPHETAMINE SACCHARATE, AMPHETAMINE ASPARTATE, DEXTROAMPHETAMINE SULFATE AND AMPHETAMINE SULFATE 7.5; 7.5; 7.5; 7.5 MG/1; MG/1; MG/1; MG/1
30 TABLET ORAL 2 TIMES DAILY
Qty: 60 TABLET | Refills: 0 | Status: SHIPPED | OUTPATIENT
Start: 2025-06-06 | End: 2025-04-30 | Stop reason: SDUPTHER

## 2025-04-30 RX ORDER — DEXTROAMPHETAMINE SACCHARATE, AMPHETAMINE ASPARTATE, DEXTROAMPHETAMINE SULFATE AND AMPHETAMINE SULFATE 7.5; 7.5; 7.5; 7.5 MG/1; MG/1; MG/1; MG/1
30 TABLET ORAL 2 TIMES DAILY
Qty: 60 TABLET | Refills: 0 | Status: SHIPPED | OUTPATIENT
Start: 2025-05-07

## 2025-04-30 RX ORDER — LEVOTHYROXINE SODIUM 25 UG/1
25 TABLET ORAL
Qty: 90 TABLET | Refills: 1 | Status: SHIPPED | OUTPATIENT
Start: 2025-04-30

## 2025-04-30 RX ORDER — DEXTROAMPHETAMINE SACCHARATE, AMPHETAMINE ASPARTATE, DEXTROAMPHETAMINE SULFATE AND AMPHETAMINE SULFATE 7.5; 7.5; 7.5; 7.5 MG/1; MG/1; MG/1; MG/1
30 TABLET ORAL 2 TIMES DAILY
Qty: 60 TABLET | Refills: 0 | Status: SHIPPED | OUTPATIENT
Start: 2025-05-07 | End: 2025-04-30 | Stop reason: SDUPTHER

## 2025-04-30 RX ORDER — DEXTROAMPHETAMINE SACCHARATE, AMPHETAMINE ASPARTATE, DEXTROAMPHETAMINE SULFATE AND AMPHETAMINE SULFATE 7.5; 7.5; 7.5; 7.5 MG/1; MG/1; MG/1; MG/1
1 TABLET ORAL 2 TIMES DAILY
Qty: 60 TABLET | Refills: 0 | Status: SHIPPED | OUTPATIENT
Start: 2025-07-03

## 2025-04-30 RX ORDER — VALACYCLOVIR HYDROCHLORIDE 1 G/1
1000 TABLET, FILM COATED ORAL
COMMUNITY
End: 2025-04-30 | Stop reason: SDUPTHER

## 2025-04-30 RX ORDER — DEXTROAMPHETAMINE SACCHARATE, AMPHETAMINE ASPARTATE, DEXTROAMPHETAMINE SULFATE AND AMPHETAMINE SULFATE 7.5; 7.5; 7.5; 7.5 MG/1; MG/1; MG/1; MG/1
1 TABLET ORAL 2 TIMES DAILY
Qty: 60 TABLET | Refills: 0 | Status: SHIPPED | OUTPATIENT
Start: 2025-07-03 | End: 2025-04-30 | Stop reason: SDUPTHER

## 2025-04-30 NOTE — PROGRESS NOTES
dSubjective:       Patient ID: Keren Tong is a 54 y.o. female.    Chief Complaint:   History of Present Illness          ADHD:  Requests ADHD medication refill. Reports symptoms of impulsiveness, poor time management skills, trouble multitasking, problems focusing on a task, disorganization and problems prioritizing are improved and manageable with current medication regimen. Denies s/e of heart palpations, insomnia, weight is stable and  is consistent.       Resigned 4/14/25. Moved out of the house a week later and will be going through a divorce  Past Medical History:   Diagnosis Date    ADHD 2014    Thyroid disease 2021       Past Surgical History:   Procedure Laterality Date    APPENDECTOMY      ARTHROSCOPIC ACROMIOPLASTY OF SHOULDER  03/11/2022    Procedure: ACROMIOPLASTY, ARTHROSCOPIC;  Surgeon: Jose Angel Isaac MD;  Location: [facility] OR;  Service: Orthopedics;;    ARTHROSCOPIC DEBRIDEMENT OF SHOULDER  03/11/2022    Procedure: DEBRIDEMENT, SHOULDER, ARTHROSCOPIC;  Surgeon: Jose Angel Isaac MD;  Location: [facility] OR;  Service: Orthopedics;;    ARTHROSCOPY OF SHOULDER WITH DECOMPRESSION OF SUBACROMIAL SPACE  03/11/2022    Procedure: ARTHROSCOPY, SHOULDER, WITH SUBACROMIAL SPACE DECOMPRESSION;  Surgeon: Jose Angel Isaac MD;  Location: [facility] OR;  Service: Orthopedics;;    AUGMENTATION OF BREAST      BREAST SURGERY      implants removed    CARPAL TUNNEL RELEASE      COLONOSCOPY N/A 12/08/2023    Procedure: COLONOSCOPY;  Surgeon: Molly Miller MD;  Location: [facility];  Service: Endoscopy;  Laterality: N/A;    OOPHORECTOMY          Social History[1]    Family History   Problem Relation Name Age of Onset    Hypertension Mother      Hypertension Father      Cancer Maternal Cousin          maternal second cousin       Review of patient's allergies indicates:  No Known Allergies     Current Medications[2]    Follow-up      Review of Systems   Constitutional: Negative.    HENT: Negative.      Eyes: Negative.    Respiratory: Negative.     Cardiovascular: Negative.    Gastrointestinal: Negative.    Endocrine: Negative.    Genitourinary: Negative.    Musculoskeletal: Negative.    Skin: Negative.    Allergic/Immunologic: Negative.    Neurological: Negative.    Hematological: Negative.    Psychiatric/Behavioral:  Positive for decreased concentration.              Objective:      Physical Exam  Vitals and nursing note reviewed.   Constitutional:       General: She is not in acute distress.     Appearance: Normal appearance. She is well-developed and normal weight. She is not ill-appearing.   HENT:      Head: Normocephalic.   Eyes:      Conjunctiva/sclera: Conjunctivae normal.   Neck:      Thyroid: No thyromegaly.   Cardiovascular:      Rate and Rhythm: Normal rate and regular rhythm.      Heart sounds: Normal heart sounds. No murmur heard.  Pulmonary:      Effort: Pulmonary effort is normal.      Breath sounds: Normal breath sounds. No wheezing or rales.   Musculoskeletal:         General: Normal range of motion.      Cervical back: Normal range of motion.      Right lower leg: No edema.      Left lower leg: No edema.   Skin:     General: Skin is warm and dry.   Neurological:      Mental Status: She is alert and oriented to person, place, and time. Mental status is at baseline.   Psychiatric:         Mood and Affect: Mood normal.         Behavior: Behavior normal.         Thought Content: Thought content normal.         Judgment: Judgment normal.         Assessment:      1. Attention deficit hyperactivity disorder (ADHD), combined type    2. Breast cancer screening by mammogram    3. Fever blister    4. Acquired hypothyroidism        Plan:    1- Mammo 6/13/25  2- RTC 3 mo   3- 3 mo sent of adderall  -      Attention deficit hyperactivity disorder (ADHD), combined type  -     POCT Urine Drug Screen (With BUP)  -     Discontinue: dextroamphetamine-amphetamine 30 mg Tab; Take 1 tablet (30 mg total) by mouth 2  (two) times daily.  Dispense: 60 tablet; Refill: 0  -     Discontinue: dextroamphetamine-amphetamine 30 mg Tab; Take 1 tablet (30 mg total) by mouth 2 (two) times a day.  Dispense: 60 tablet; Refill: 0  -     Discontinue: dextroamphetamine-amphetamine 30 mg Tab; Take 1 tablet (30 mg total) by mouth 2 (two) times a day.  Dispense: 60 tablet; Refill: 0  -     dextroamphetamine-amphetamine 30 mg Tab; Take 1 tablet (30 mg total) by mouth 2 (two) times daily.  Dispense: 60 tablet; Refill: 0  -     dextroamphetamine-amphetamine 30 mg Tab; Take 1 tablet (30 mg total) by mouth 2 (two) times a day.  Dispense: 60 tablet; Refill: 0  -     dextroamphetamine-amphetamine 30 mg Tab; Take 1 tablet (30 mg total) by mouth 2 (two) times a day.  Dispense: 60 tablet; Refill: 0    Breast cancer screening by mammogram  -     Mammo Digital Screening Bilat w/ Jake (XPD); Future; Expected date: 06/13/2025    Fever blister  -     valACYclovir (VALTREX) 1000 MG tablet; 2 tabs then 2 tabs 12 hr later PRN fever blisters  Dispense: 30 tablet; Refill: 3    Acquired hypothyroidism  -     levothyroxine (SYNTHROID) 25 MCG tablet; Take 1 tablet (25 mcg total) by mouth before breakfast.  Dispense: 90 tablet; Refill: 1        Risks, benefits, and side effects were discussed with the patient. All questions were answered to the fullest satisfaction of the patient, and pt verbalized understanding and agreement to treatment plan. Pt was to call with any new or worsening symptoms, or present to the ER.        This note was generated with the assistance of ambient listening technology. Verbal consent was obtained by the patient and accompanying visitor(s) for the recording of patient appointment to facilitate this note. I attest to having reviewed and edited the generated note for accuracy, though some syntax or spelling errors may persist. Please contact the author of this note for any clarification.                 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   Tobacco Use    Smoking status: Former     Current packs/day: 0.00     Types: Cigarettes     Quit date: 2020     Years since quittin.2    Smokeless tobacco: Never   Substance and Sexual Activity    Alcohol use: Not Currently     Comment: rare socail    Drug use: Never    Sexual activity: Yes     Partners: Male     Social Drivers of Health     Financial Resource Strain: Low Risk  (2025)    Overall Financial Resource Strain (CARDIA)     Difficulty of Paying Living Expenses: Not hard at all   Food Insecurity: No Food Insecurity (2025)    Hunger Vital Sign     Worried About Running Out of Food in the Last Year: Never true     Ran Out of Food in the Last Year: Never true   Transportation Needs: No Transportation Needs (2025)    PRAPARE - Transportation     Lack of Transportation (Medical): No     Lack of Transportation (Non-Medical): No   Physical Activity: Inactive (2025)    Exercise Vital Sign     Days of Exercise per Week: 0 days     Minutes of Exercise per Session: 0 min   Stress: Stress Concern Present (2025)    [employer] - Occupational Stress Questionnaire     Feeling of Stress : Rather much   Housing Stability: Low Risk  (2025)    Housing Stability Vital Sign     Unable to Pay for Housing in the Last Year: No     Number of Times Moved in the Last Year: 1     Homeless in the Last Year: No   [2]   Current Outpatient Medications:     [START ON 7/3/2025] dextroamphetamine-amphetamine 30 mg Tab, Take 1 tablet (30 mg total) by mouth 2 (two) times daily., Disp: 60 tablet, Rfl: 0    [START ON 2025] dextroamphetamine-amphetamine 30 mg Tab, Take 1 tablet (30 mg total) by mouth 2 (two) times a day., Disp: 60 tablet, Rfl: 0    dextroamphetamine-amphetamine 30 mg  Tab, Take 1 tablet (30 mg total) by mouth 2 (two) times a day., Disp: 60 tablet, Rfl: 0    levothyroxine (SYNTHROID) 25 MCG tablet, Take 1 tablet (25 mcg total) by mouth before breakfast., Disp: 90 tablet, Rfl: 1    valACYclovir (VALTREX) 1000 MG tablet, 2 tabs then 2 tabs 12 hr later PRN fever blisters, Disp: 30 tablet, Rfl: 3

## 2025-05-03 ENCOUNTER — RESULTS FOLLOW-UP (OUTPATIENT)
Dept: FAMILY MEDICINE | Facility: CLINIC | Age: 55
End: 2025-05-03

## 2025-06-05 DIAGNOSIS — F90.2 ATTENTION DEFICIT HYPERACTIVITY DISORDER (ADHD), COMBINED TYPE: ICD-10-CM

## 2025-06-06 ENCOUNTER — PATIENT MESSAGE (OUTPATIENT)
Dept: FAMILY MEDICINE | Facility: CLINIC | Age: 55
End: 2025-06-06

## 2025-06-07 RX ORDER — DEXTROAMPHETAMINE SACCHARATE, AMPHETAMINE ASPARTATE, DEXTROAMPHETAMINE SULFATE AND AMPHETAMINE SULFATE 7.5; 7.5; 7.5; 7.5 MG/1; MG/1; MG/1; MG/1
30 TABLET ORAL 2 TIMES DAILY
Qty: 60 TABLET | Refills: 0 | Status: SHIPPED | OUTPATIENT
Start: 2025-06-07 | End: 2025-06-09 | Stop reason: SDUPTHER

## 2025-06-09 DIAGNOSIS — F41.1 ANXIETY IN ACUTE STRESS REACTION: Primary | ICD-10-CM

## 2025-06-09 DIAGNOSIS — F43.0 ANXIETY IN ACUTE STRESS REACTION: Primary | ICD-10-CM

## 2025-06-09 DIAGNOSIS — F90.2 ATTENTION DEFICIT HYPERACTIVITY DISORDER (ADHD), COMBINED TYPE: ICD-10-CM

## 2025-06-09 RX ORDER — DEXTROAMPHETAMINE SACCHARATE, AMPHETAMINE ASPARTATE, DEXTROAMPHETAMINE SULFATE AND AMPHETAMINE SULFATE 7.5; 7.5; 7.5; 7.5 MG/1; MG/1; MG/1; MG/1
30 TABLET ORAL 2 TIMES DAILY
Qty: 60 TABLET | Refills: 0 | Status: SHIPPED | OUTPATIENT
Start: 2025-06-09

## 2025-06-09 RX ORDER — ALPRAZOLAM 0.25 MG/1
0.25 TABLET ORAL 3 TIMES DAILY PRN
Qty: 21 TABLET | Refills: 0 | Status: SHIPPED | OUTPATIENT
Start: 2025-06-09

## 2025-06-09 RX ORDER — ALPRAZOLAM 0.25 MG/1
0.25 TABLET ORAL 3 TIMES DAILY PRN
Qty: 21 TABLET | Refills: 0 | Status: SHIPPED | OUTPATIENT
Start: 2025-06-09 | End: 2025-06-09 | Stop reason: SDUPTHER

## 2025-06-09 NOTE — TELEPHONE ENCOUNTER
Pt going out of town with adderall due but [facility] out of medication. Current life stressors discussed at last visit thus xanax given and both scripts printed

## 2025-06-09 NOTE — TELEPHONE ENCOUNTER
Pt requesting paper scripts to  as she is going out of town. Pt also requesting a script for xanax due to discussions from [facility]: 04/30/2025    ADDERALL REFILL AND XANAX requested

## 2025-06-10 ENCOUNTER — OFFICE VISIT (OUTPATIENT)
Dept: URGENT CARE | Facility: CLINIC | Age: 55
End: 2025-06-10
Payer: COMMERCIAL

## 2025-06-10 VITALS
HEART RATE: 80 BPM | HEIGHT: 67 IN | TEMPERATURE: 98 F | DIASTOLIC BLOOD PRESSURE: 89 MMHG | WEIGHT: 141 LBS | OXYGEN SATURATION: 98 % | SYSTOLIC BLOOD PRESSURE: 139 MMHG | BODY MASS INDEX: 22.13 KG/M2 | RESPIRATION RATE: 16 BRPM

## 2025-06-10 DIAGNOSIS — H00.015 HORDEOLUM EXTERNUM OF LEFT LOWER EYELID: ICD-10-CM

## 2025-06-10 DIAGNOSIS — L03.90 CELLULITIS, UNSPECIFIED CELLULITIS SITE: Primary | ICD-10-CM

## 2025-06-10 PROCEDURE — 99213 OFFICE O/P EST LOW 20 MIN: CPT | Mod: S$GLB,,, | Performed by: FAMILY MEDICINE

## 2025-06-10 PROCEDURE — 99173 VISUAL ACUITY SCREEN: CPT | Mod: S$GLB,,, | Performed by: FAMILY MEDICINE

## 2025-06-10 RX ORDER — ERYTHROMYCIN 5 MG/G
OINTMENT OPHTHALMIC EVERY 8 HOURS
Qty: 3.5 G | Refills: 0 | Status: SHIPPED | OUTPATIENT
Start: 2025-06-10

## 2025-06-10 RX ORDER — CEPHALEXIN 500 MG/1
500 CAPSULE ORAL EVERY 8 HOURS
Qty: 30 CAPSULE | Refills: 0 | Status: SHIPPED | OUTPATIENT
Start: 2025-06-10

## 2025-06-11 NOTE — PROGRESS NOTES
Subjective:      Patient ID: Keren Tong is a 54 y.o. female.    Vitals:  vitals were not taken for this visit.     Chief Complaint: Eye Problem    This is a 54 y.o. female who presents today with a chief complaint of  eye problem. Patient 's left is red and swollen. Patient explained that it started as a stye on her upper lid. Symptoms started last Wednesday.       Eye Problem   The left eye is affected. This is a new problem. The current episode started in the past 7 days. The problem occurs constantly. The problem has been gradually worsening. The injury mechanism is unknown. The pain is at a severity of 3/10. The pain is mild. There is No known exposure to pink eye. She Does not wear contacts. Associated symptoms include blurred vision, an eye discharge, eye redness, itching and photophobia. She has tried eye drops (topadex) for the symptoms. The treatment provided no relief.     Eyes:  Positive for eye discharge, eye itching, eye redness, photophobia and blurred vision.    Objective:     Physical Exam   Constitutional: She appears ill. She appears distressed.   HENT:   Head: Normocephalic and atraumatic.   Ears:   Right Ear: External ear normal.   Left Ear: External ear normal.   Nose: No rhinorrhea or congestion.   Eyes: Pupils are equal, round, and reactive to light. Right eye exhibits no chemosis, no discharge and no hordeolum. No foreign body present in the right eye. Left eye exhibits hordeolum. Left eye exhibits no chemosis and no discharge. No foreign body present in the left eye. Right conjunctiva is not injected. Left conjunctiva is injected. Right eye exhibits normal extraocular motion and no nystagmus. Left eye exhibits normal extraocular motion and no nystagmus.     Extraocular movement intact      Comments: Periorbital puffiness  Mild erythema no warmt  2 styes on left lower lid   Abdominal: Normal appearance.   Neurological: She is alert.   Nursing note and vitals reviewed.    Assessment:      No diagnosis found.    Plan:       There are no diagnoses linked to this encounter.

## 2025-06-20 ENCOUNTER — PATIENT MESSAGE (OUTPATIENT)
Dept: FAMILY MEDICINE | Facility: CLINIC | Age: 55
End: 2025-06-20
Payer: COMMERCIAL

## 2025-07-11 ENCOUNTER — PATIENT MESSAGE (OUTPATIENT)
Dept: FAMILY MEDICINE | Facility: CLINIC | Age: 55
End: 2025-07-11
Payer: COMMERCIAL

## 2025-07-11 DIAGNOSIS — Z12.31 BREAST CANCER SCREENING BY MAMMOGRAM: Primary | ICD-10-CM

## 2025-07-11 DIAGNOSIS — F90.2 ATTENTION DEFICIT HYPERACTIVITY DISORDER (ADHD), COMBINED TYPE: ICD-10-CM

## 2025-07-11 RX ORDER — DEXTROAMPHETAMINE SACCHARATE, AMPHETAMINE ASPARTATE, DEXTROAMPHETAMINE SULFATE AND AMPHETAMINE SULFATE 7.5; 7.5; 7.5; 7.5 MG/1; MG/1; MG/1; MG/1
1 TABLET ORAL 2 TIMES DAILY
Qty: 60 TABLET | Refills: 0 | Status: CANCELLED | OUTPATIENT
Start: 2025-07-11

## 2025-07-11 RX ORDER — DEXTROAMPHETAMINE SACCHARATE, AMPHETAMINE ASPARTATE, DEXTROAMPHETAMINE SULFATE AND AMPHETAMINE SULFATE 7.5; 7.5; 7.5; 7.5 MG/1; MG/1; MG/1; MG/1
1 TABLET ORAL 2 TIMES DAILY
Qty: 60 TABLET | Refills: 0 | Status: SHIPPED | OUTPATIENT
Start: 2025-07-11

## 2025-07-11 NOTE — TELEPHONE ENCOUNTER
"Adhd prescribed on 07/03/2025 and sent to [facility]. Pt stated "[facility] does not have a refill for me"  Pt request refill for adhd medication be sent in to [facility].  Writer spoke to [facility] and pharmacy tech stated they had received it but it was cancelled by clinic staff.       "

## 2025-08-25 ENCOUNTER — PATIENT MESSAGE (OUTPATIENT)
Dept: ADMINISTRATIVE | Facility: HOSPITAL | Age: 55
End: 2025-08-25
Payer: COMMERCIAL

## (undated) DEVICE — NEPTUNE 4 PORT MANIFOLD

## (undated) DEVICE — ELECTRODE REM PLYHSV RETURN 9

## (undated) DEVICE — PROBE ABLATION RF ARTHSCP 3.5

## (undated) DEVICE — Device

## (undated) DEVICE — MAT QUICK 40X30 FLOOR FLUID LF

## (undated) DEVICE — DRAPE ORTH TIBURON 77X108IN

## (undated) DEVICE — DRAPE INCISE IOBAN 2 23X17IN

## (undated) DEVICE — GLOVE SURGICAL LATEX SZ 8

## (undated) DEVICE — DRAPE STERI U-SHAPED 47X51IN

## (undated) DEVICE — GAUZE SPONGE 4X4 12PLY

## (undated) DEVICE — BLADE SURG CARBON STEEL SZ11

## (undated) DEVICE — APPLICATOR CHLORAPREP ORN 26ML

## (undated) DEVICE — SUT ETHILON 3-0 FS-1 30

## (undated) DEVICE — SOL IRR NACL .9% 3000ML

## (undated) DEVICE — SLEEVE LATERAL TRACTION ARM

## (undated) DEVICE — CONNECTOR TUBING STR 5 IN 1

## (undated) DEVICE — CUTTER AGGRESSIVE PLUS 3.5MM

## (undated) DEVICE — UNDERGLOVES BIOGEL PI SIZE 7.5

## (undated) DEVICE — PACK SHOULDER

## (undated) DEVICE — DRESSING N ADH OIL EMUL 3X3

## (undated) DEVICE — DRAPE PLASTIC U 60X72

## (undated) DEVICE — BLANKET LOWER BODY 55.9X40.2IN

## (undated) DEVICE — TUBING SUC UNIV W/CONN 12FT

## (undated) DEVICE — CUTTER MENISCUS AGGRESSIVE 4.0

## (undated) DEVICE — GLOVE SURG ULTRA TOUCH 7.5

## (undated) DEVICE — SYR ONLY LUER LOCK 20CC

## (undated) DEVICE — SEE MEDLINE ITEM 157131

## (undated) DEVICE — TUBE SET INFLOW/OUTFLOW

## (undated) DEVICE — PUMP COLD THERAPY

## (undated) DEVICE — SLEEVE SCD EXPRESS CALF MEDIUM

## (undated) DEVICE — DRAPE STERI-DRAPE 1000 17X11IN

## (undated) DEVICE — NDL SPINAL 18GX3.5 SPINOCAN

## (undated) DEVICE — TOWEL OR DISP STRL BLUE 4/PK

## (undated) DEVICE — PAD SHOULDER CARE POLAR

## (undated) DEVICE — DRAPE STERI INSTRUMENT 1018